# Patient Record
Sex: FEMALE | Race: OTHER | Employment: FULL TIME | URBAN - METROPOLITAN AREA
[De-identification: names, ages, dates, MRNs, and addresses within clinical notes are randomized per-mention and may not be internally consistent; named-entity substitution may affect disease eponyms.]

---

## 2021-01-04 ENCOUNTER — HOSPITAL ENCOUNTER (EMERGENCY)
Facility: HOSPITAL | Age: 21
Discharge: HOME | End: 2021-01-05
Attending: EMERGENCY MEDICINE
Payer: COMMERCIAL

## 2021-01-04 DIAGNOSIS — R42 VERTIGO: Primary | ICD-10-CM

## 2021-01-04 DIAGNOSIS — R11.2 NON-INTRACTABLE VOMITING WITH NAUSEA, UNSPECIFIED VOMITING TYPE: ICD-10-CM

## 2021-01-04 LAB
ALBUMIN SERPL-MCNC: 2.8 G/DL (ref 3.4–5)
ALBUMIN/GLOB SERPL: 0.7 {RATIO} (ref 1–2)
ALP LIVER SERPL-CCNC: 43 U/L
ALT SERPL-CCNC: 14 U/L
ANION GAP SERPL CALC-SCNC: 5 MMOL/L (ref 0–18)
AST SERPL-CCNC: 12 U/L (ref 15–37)
BASOPHILS # BLD AUTO: 0.02 X10(3) UL (ref 0–0.2)
BASOPHILS NFR BLD AUTO: 0.3 %
BILIRUB SERPL-MCNC: 0.2 MG/DL (ref 0.1–2)
BILIRUB UR QL STRIP.AUTO: NEGATIVE
BUN BLD-MCNC: 6 MG/DL (ref 7–18)
BUN/CREAT SERPL: 14.3 (ref 10–20)
CALCIUM BLD-MCNC: 8.4 MG/DL (ref 8.5–10.1)
CHLORIDE SERPL-SCNC: 109 MMOL/L (ref 98–112)
CLARITY UR REFRACT.AUTO: CLEAR
CO2 SERPL-SCNC: 21 MMOL/L (ref 21–32)
CREAT BLD-MCNC: 0.42 MG/DL
DEPRECATED RDW RBC AUTO: 42.2 FL (ref 35.1–46.3)
EOSINOPHIL # BLD AUTO: 0.05 X10(3) UL (ref 0–0.7)
EOSINOPHIL NFR BLD AUTO: 0.6 %
ERYTHROCYTE [DISTWIDTH] IN BLOOD BY AUTOMATED COUNT: 13.4 % (ref 11–15)
GLOBULIN PLAS-MCNC: 3.8 G/DL (ref 2.8–4.4)
GLUCOSE BLD-MCNC: 81 MG/DL (ref 70–99)
GLUCOSE UR STRIP.AUTO-MCNC: NEGATIVE MG/DL
HCT VFR BLD AUTO: 35.4 %
HGB BLD-MCNC: 12 G/DL
IMM GRANULOCYTES # BLD AUTO: 0.02 X10(3) UL (ref 0–1)
IMM GRANULOCYTES NFR BLD: 0.3 %
KETONES UR STRIP.AUTO-MCNC: NEGATIVE MG/DL
LEUKOCYTE ESTERASE UR QL STRIP.AUTO: NEGATIVE
LYMPHOCYTES # BLD AUTO: 1.73 X10(3) UL (ref 1–4)
LYMPHOCYTES NFR BLD AUTO: 21.9 %
M PROTEIN MFR SERPL ELPH: 6.6 G/DL (ref 6.4–8.2)
MCH RBC QN AUTO: 29.5 PG (ref 26–34)
MCHC RBC AUTO-ENTMCNC: 33.9 G/DL (ref 31–37)
MCV RBC AUTO: 87 FL
MONOCYTES # BLD AUTO: 0.59 X10(3) UL (ref 0.1–1)
MONOCYTES NFR BLD AUTO: 7.5 %
NEUTROPHILS # BLD AUTO: 5.48 X10 (3) UL (ref 1.5–7.7)
NEUTROPHILS # BLD AUTO: 5.48 X10(3) UL (ref 1.5–7.7)
NEUTROPHILS NFR BLD AUTO: 69.4 %
NITRITE UR QL STRIP.AUTO: NEGATIVE
OSMOLALITY SERPL CALC.SUM OF ELEC: 277 MOSM/KG (ref 275–295)
PH UR STRIP.AUTO: 6 [PH] (ref 4.5–8)
PLATELET # BLD AUTO: 243 10(3)UL (ref 150–450)
POTASSIUM SERPL-SCNC: 3.3 MMOL/L (ref 3.5–5.1)
PROT UR STRIP.AUTO-MCNC: NEGATIVE MG/DL
RBC # BLD AUTO: 4.07 X10(6)UL
RBC UR QL AUTO: NEGATIVE
SODIUM SERPL-SCNC: 135 MMOL/L (ref 136–145)
SP GR UR STRIP.AUTO: 1.01 (ref 1–1.03)
UROBILINOGEN UR STRIP.AUTO-MCNC: <2 MG/DL
WBC # BLD AUTO: 7.9 X10(3) UL (ref 4–11)

## 2021-01-04 PROCEDURE — 85025 COMPLETE CBC W/AUTO DIFF WBC: CPT | Performed by: EMERGENCY MEDICINE

## 2021-01-04 PROCEDURE — 81003 URINALYSIS AUTO W/O SCOPE: CPT | Performed by: EMERGENCY MEDICINE

## 2021-01-04 PROCEDURE — 80053 COMPREHEN METABOLIC PANEL: CPT | Performed by: EMERGENCY MEDICINE

## 2021-01-04 PROCEDURE — 96361 HYDRATE IV INFUSION ADD-ON: CPT

## 2021-01-04 PROCEDURE — 96374 THER/PROPH/DIAG INJ IV PUSH: CPT

## 2021-01-04 PROCEDURE — 99284 EMERGENCY DEPT VISIT MOD MDM: CPT

## 2021-01-04 PROCEDURE — 96375 TX/PRO/DX INJ NEW DRUG ADDON: CPT

## 2021-01-04 RX ORDER — ONDANSETRON 4 MG/1
4 TABLET, ORALLY DISINTEGRATING ORAL EVERY 4 HOURS PRN
Qty: 10 TABLET | Refills: 0 | Status: SHIPPED | OUTPATIENT
Start: 2021-01-04 | End: 2021-01-11

## 2021-01-04 RX ORDER — METOCLOPRAMIDE HYDROCHLORIDE 5 MG/ML
10 INJECTION INTRAMUSCULAR; INTRAVENOUS ONCE
Status: COMPLETED | OUTPATIENT
Start: 2021-01-04 | End: 2021-01-04

## 2021-01-04 RX ORDER — DIPHENHYDRAMINE HYDROCHLORIDE 50 MG/ML
25 INJECTION INTRAMUSCULAR; INTRAVENOUS ONCE
Status: COMPLETED | OUTPATIENT
Start: 2021-01-04 | End: 2021-01-04

## 2021-01-04 RX ORDER — MECLIZINE HYDROCHLORIDE 25 MG/1
25 TABLET ORAL 3 TIMES DAILY PRN
Qty: 15 TABLET | Refills: 0 | Status: SHIPPED | OUTPATIENT
Start: 2021-01-04

## 2021-01-05 VITALS
DIASTOLIC BLOOD PRESSURE: 77 MMHG | BODY MASS INDEX: 22 KG/M2 | SYSTOLIC BLOOD PRESSURE: 110 MMHG | WEIGHT: 115 LBS | OXYGEN SATURATION: 100 % | RESPIRATION RATE: 14 BRPM | HEART RATE: 66 BPM | TEMPERATURE: 98 F

## 2021-01-05 NOTE — ED PROVIDER NOTES
Patient Seen in: [facility] Emergency Department      History   Patient presents with:  Nausea/Vomiting/Diarrhea    Stated Complaint: vomiting since yesterday, not typical for this pregnancy, 14 weeks     HPI/Subjective:   HPI    78-year-old female show no pallor. Oropharynx clear, mucous membranes moist   Neck: supple, no rigidity   Lungs: good air exchange and clear   Heart: regular rate rhythm and no murmur   Abdomen: Soft and nontender. No abdominal masses.   No peritoneal signs   Extremities: n Impression:  Vertigo  (primary encounter diagnosis)  Non-intractable vomiting with nausea, unspecified vomiting type    Disposition:  Discharge  1/5/2021 12:08 am    Follow-up:  Your primary care doctor    Call            Medications Prescribed:  Current D

## 2021-01-05 NOTE — ED INITIAL ASSESSMENT (HPI)
20YF c/ cof NVD Pt state that she been vomiting and dizzy all day Pt state she is 14 weeks preg.  Pt denies any abd pain, discharge and bleeding

## 2022-01-06 ENCOUNTER — HOSPITAL ENCOUNTER (EMERGENCY)
Age: 22
Discharge: HOME | End: 2022-01-07
Attending: EMERGENCY MEDICINE
Payer: COMMERCIAL

## 2022-01-06 DIAGNOSIS — N73.0 PID (ACUTE PELVIC INFLAMMATORY DISEASE): Primary | ICD-10-CM

## 2022-01-06 LAB
ALBUMIN SERPL-MCNC: 3.5 G/DL (ref 3.4–5)
ALBUMIN/GLOB SERPL: 0.9 {RATIO} (ref 1–2)
ALP LIVER SERPL-CCNC: 68 U/L
ALT SERPL-CCNC: 21 U/L
ANION GAP SERPL CALC-SCNC: 4 MMOL/L (ref 0–18)
AST SERPL-CCNC: 14 U/L (ref 15–37)
B-HCG UR QL: NEGATIVE
BASOPHILS # BLD AUTO: 0.03 X10(3) UL (ref 0–0.2)
BASOPHILS NFR BLD AUTO: 0.4 %
BILIRUB SERPL-MCNC: 0.3 MG/DL (ref 0.1–2)
BILIRUB UR QL STRIP.AUTO: NEGATIVE
BUN BLD-MCNC: 12 MG/DL (ref 7–18)
CALCIUM BLD-MCNC: 9 MG/DL (ref 8.5–10.1)
CHLORIDE SERPL-SCNC: 108 MMOL/L (ref 98–112)
CLARITY UR REFRACT.AUTO: CLEAR
CO2 SERPL-SCNC: 27 MMOL/L (ref 21–32)
COLOR UR AUTO: YELLOW
CREAT BLD-MCNC: 0.61 MG/DL
EOSINOPHIL # BLD AUTO: 0.14 X10(3) UL (ref 0–0.7)
EOSINOPHIL NFR BLD AUTO: 1.6 %
ERYTHROCYTE [DISTWIDTH] IN BLOOD BY AUTOMATED COUNT: 14.2 %
GLOBULIN PLAS-MCNC: 4.1 G/DL (ref 2.8–4.4)
GLUCOSE BLD-MCNC: 100 MG/DL (ref 70–99)
GLUCOSE UR STRIP.AUTO-MCNC: NEGATIVE MG/DL
HCT VFR BLD AUTO: 38.5 %
HGB BLD-MCNC: 12.5 G/DL
IMM GRANULOCYTES # BLD AUTO: 0.02 X10(3) UL (ref 0–1)
IMM GRANULOCYTES NFR BLD: 0.2 %
LEUKOCYTE ESTERASE UR QL STRIP.AUTO: NEGATIVE
LIPASE SERPL-CCNC: 160 U/L (ref 73–393)
LYMPHOCYTES # BLD AUTO: 2.23 X10(3) UL (ref 1–4)
LYMPHOCYTES NFR BLD AUTO: 26 %
MCH RBC QN AUTO: 27.8 PG (ref 26–34)
MCHC RBC AUTO-ENTMCNC: 32.5 G/DL (ref 31–37)
MCV RBC AUTO: 85.6 FL
MONOCYTES # BLD AUTO: 0.58 X10(3) UL (ref 0.1–1)
MONOCYTES NFR BLD AUTO: 6.8 %
NEUTROPHILS # BLD AUTO: 5.57 X10 (3) UL (ref 1.5–7.7)
NEUTROPHILS # BLD AUTO: 5.57 X10(3) UL (ref 1.5–7.7)
NEUTROPHILS NFR BLD AUTO: 65 %
NITRITE UR QL STRIP.AUTO: NEGATIVE
OSMOLALITY SERPL CALC.SUM OF ELEC: 288 MOSM/KG (ref 275–295)
PH UR STRIP.AUTO: 6 [PH] (ref 5–8)
PLATELET # BLD AUTO: 332 10(3)UL (ref 150–450)
POTASSIUM SERPL-SCNC: 3.2 MMOL/L (ref 3.5–5.1)
PROT SERPL-MCNC: 7.6 G/DL (ref 6.4–8.2)
RBC # BLD AUTO: 4.5 X10(6)UL
RBC UR QL AUTO: NEGATIVE
SODIUM SERPL-SCNC: 139 MMOL/L (ref 136–145)
SP GR UR STRIP.AUTO: >=1.03 (ref 1–1.03)
UROBILINOGEN UR STRIP.AUTO-MCNC: 0.2 MG/DL
WBC # BLD AUTO: 8.6 X10(3) UL (ref 4–11)

## 2022-01-06 PROCEDURE — 99285 EMERGENCY DEPT VISIT HI MDM: CPT

## 2022-01-06 PROCEDURE — 96374 THER/PROPH/DIAG INJ IV PUSH: CPT

## 2022-01-06 PROCEDURE — 96361 HYDRATE IV INFUSION ADD-ON: CPT

## 2022-01-06 PROCEDURE — 81001 URINALYSIS AUTO W/SCOPE: CPT | Performed by: EMERGENCY MEDICINE

## 2022-01-06 PROCEDURE — 85025 COMPLETE CBC W/AUTO DIFF WBC: CPT | Performed by: EMERGENCY MEDICINE

## 2022-01-06 PROCEDURE — 83690 ASSAY OF LIPASE: CPT | Performed by: EMERGENCY MEDICINE

## 2022-01-06 PROCEDURE — 81025 URINE PREGNANCY TEST: CPT

## 2022-01-06 PROCEDURE — 80053 COMPREHEN METABOLIC PANEL: CPT | Performed by: EMERGENCY MEDICINE

## 2022-01-06 PROCEDURE — 96372 THER/PROPH/DIAG INJ SC/IM: CPT

## 2022-01-06 PROCEDURE — 99284 EMERGENCY DEPT VISIT MOD MDM: CPT

## 2022-01-07 ENCOUNTER — APPOINTMENT (OUTPATIENT)
Dept: CT IMAGING | Age: 22
End: 2022-01-07
Attending: EMERGENCY MEDICINE
Payer: COMMERCIAL

## 2022-01-07 ENCOUNTER — APPOINTMENT (OUTPATIENT)
Dept: ULTRASOUND IMAGING | Age: 22
End: 2022-01-07
Attending: EMERGENCY MEDICINE
Payer: COMMERCIAL

## 2022-01-07 VITALS
SYSTOLIC BLOOD PRESSURE: 113 MMHG | DIASTOLIC BLOOD PRESSURE: 65 MMHG | TEMPERATURE: 98 F | HEART RATE: 65 BPM | RESPIRATION RATE: 17 BRPM | BODY MASS INDEX: 21 KG/M2 | OXYGEN SATURATION: 99 % | WEIGHT: 110 LBS

## 2022-01-07 PROCEDURE — 76830 TRANSVAGINAL US NON-OB: CPT | Performed by: EMERGENCY MEDICINE

## 2022-01-07 PROCEDURE — 87591 N.GONORRHOEAE DNA AMP PROB: CPT | Performed by: EMERGENCY MEDICINE

## 2022-01-07 PROCEDURE — 74177 CT ABD & PELVIS W/CONTRAST: CPT | Performed by: EMERGENCY MEDICINE

## 2022-01-07 PROCEDURE — 87480 CANDIDA DNA DIR PROBE: CPT | Performed by: EMERGENCY MEDICINE

## 2022-01-07 PROCEDURE — 87510 GARDNER VAG DNA DIR PROBE: CPT | Performed by: EMERGENCY MEDICINE

## 2022-01-07 PROCEDURE — 87491 CHLMYD TRACH DNA AMP PROBE: CPT | Performed by: EMERGENCY MEDICINE

## 2022-01-07 PROCEDURE — 76856 US EXAM PELVIC COMPLETE: CPT | Performed by: EMERGENCY MEDICINE

## 2022-01-07 PROCEDURE — 93975 VASCULAR STUDY: CPT | Performed by: EMERGENCY MEDICINE

## 2022-01-07 PROCEDURE — 87660 TRICHOMONAS VAGIN DIR PROBE: CPT | Performed by: EMERGENCY MEDICINE

## 2022-01-07 RX ORDER — METRONIDAZOLE 500 MG/1
2000 TABLET ORAL ONCE
Status: COMPLETED | OUTPATIENT
Start: 2022-01-07 | End: 2022-01-07

## 2022-01-07 RX ORDER — DOXYCYCLINE HYCLATE 100 MG/1
100 CAPSULE ORAL 2 TIMES DAILY
Qty: 28 CAPSULE | Refills: 0 | Status: SHIPPED | OUTPATIENT
Start: 2022-01-07 | End: 2022-01-21

## 2022-01-07 RX ORDER — KETOROLAC TROMETHAMINE 15 MG/ML
15 INJECTION, SOLUTION INTRAMUSCULAR; INTRAVENOUS ONCE
Status: COMPLETED | OUTPATIENT
Start: 2022-01-07 | End: 2022-01-07

## 2022-01-07 NOTE — ED QUICK NOTES
Patient yelling, using profanity, and taking pictures of staff stating she is going to put on social media d/t visitor policy.

## 2022-01-07 NOTE — ED PROVIDER NOTES
Patient Seen in: [facility] Emergency Department In [place]      History   Patient presents with:  Abdomen/Flank Pain  Nausea/Vomiting/Diarrhea    Stated Complaint: Lower abdominal pain , vomiting , diarrhea    Subjective:   HPI    43-year-old female pres well-appearing, in no acute distress. HEENT: no scleral icterus. Mucous membranes are moist.  Scalp is atraumatic. HEART: Regular rate and rhythm, no murmurs. LUNGS: Clear to auscultation bilaterally. No Rales, no rhonchi, no wheezing, no stridor.   AB Report  [facility], [facility]  [phone] - Phone    [place] Nick    NAME: Faye Duránledy OF EXAM: 01/07/2022  Patient No:  [id number]  Physician:  Eugene Jin  YOB: 2000    Ai MOON stomach, spleen, pancreas, adrenal glands, kidneys and aorta. -Unremarkable small and large bowel.  -Physiologic pelvic free fluid. No pneumoperitoneum.         Differential diagnosis includes ectopic pregnancy, ovarian torsion, ovarian cyst, PID, TOA, ap Medication List    START taking these medications    doxycycline 100 MG Oral Cap  Take 1 capsule (100 mg total) by mouth 2 (two) times daily for 14 days.   Qty: 28 capsule Refills: 0

## 2022-01-07 NOTE — ED INITIAL ASSESSMENT (HPI)
Pt came in w/ c/o right lower abdomen sharp pain that started 1/4/22. Per pt vomited today and episodes of diarrhea.   Pt states also having burning sensation with urination

## 2022-01-10 LAB
C TRACH DNA SPEC QL NAA+PROBE: NEGATIVE
N GONORRHOEA DNA SPEC QL NAA+PROBE: NEGATIVE

## 2023-06-20 ENCOUNTER — TELEPHONE (OUTPATIENT)
Dept: OBGYN CLINIC | Facility: CLINIC | Age: 23
End: 2023-06-20

## 2023-06-20 NOTE — TELEPHONE ENCOUNTER
Patient calling to initiate prenatal care  LMP 5/6/23  Patient is 7-8 weeks on 7/1/23  Confirmation Ultrasound and Appointment scheduled on   Future Appointments   Date Time Provider Marcela Perla   7/17/2023 10:15 AM [facility] OB US PLFD [facility] OB[facility] P [facility] [address]   7/17/2023 10:45 AM Kate Matthews MD [facility] P [facility] [address]   8/3/2023 12:30 PM Hieu Cruz MD [facility] OB[facility]N N [facility] [address]         Any history of ectopic pregnancy? no  Any history of miscarriage? yes  Any medications that you are taking on a regular basis other than prenatal vitamins? Iron Pills (if not taking prenatal vitamins, encourage patient to start taking.)  Any bleeding since the first day of last LMP and your positive pregnancy test? No    Insurance Medicaid     Pt states she had a miscarriage a month ago and has not had a period since. She states she works for [employer] and purchased blood tests and her hcG keeps going up.

## 2023-07-05 NOTE — TELEPHONE ENCOUNTER
Spoke to patient. Patient is taking an iron supplement and vitamin D daily- has not started prenatal.  Advised patient to contact her other healthcare providers to notify of new pregnancy so current therapy can be adjusted if needed. Recommended starting prenatal vitamin now. Discussed possibility of constipation associated with iron- advised 64 oz water daily, high fiber foods, regular exercise and OTC colace as stool softener if needed. Patient denies bleeding or cramping pain since LMP. Instructed patient to go to the ER with period flow bleeding or intense cramping pain that is worse than a normal menstrual cramp. Patient verbalized understanding. US and appointment scheduled for 7/17/23.

## 2023-07-17 ENCOUNTER — ULTRASOUND ENCOUNTER (OUTPATIENT)
Dept: OBGYN CLINIC | Facility: CLINIC | Age: 23
End: 2023-07-17
Payer: COMMERCIAL

## 2023-07-17 ENCOUNTER — OFFICE VISIT (OUTPATIENT)
Dept: OBGYN CLINIC | Facility: CLINIC | Age: 23
End: 2023-07-17
Payer: COMMERCIAL

## 2023-07-17 VITALS
SYSTOLIC BLOOD PRESSURE: 100 MMHG | HEART RATE: 62 BPM | WEIGHT: 119.25 LBS | HEIGHT: 62 IN | BODY MASS INDEX: 21.94 KG/M2 | DIASTOLIC BLOOD PRESSURE: 62 MMHG

## 2023-07-17 DIAGNOSIS — N91.1 SECONDARY AMENORRHEA: Primary | ICD-10-CM

## 2023-07-17 DIAGNOSIS — Z13.79 GENETIC SCREENING: ICD-10-CM

## 2023-07-17 DIAGNOSIS — Z32.01 PREGNANCY EXAMINATION OR TEST, POSITIVE RESULT: ICD-10-CM

## 2023-07-17 RX ORDER — ERGOCALCIFEROL 1.25 MG/1
CAPSULE ORAL
COMMUNITY
Start: 2023-06-18

## 2023-07-26 LAB
GESTATIONAL AGE (IN DAYS): 3
GESTATIONAL AGE(IN WEEKS): 10
MICRODELETION: NOT DETECTED
NUMBER OF FETUSES: 1
TRISOMY 13 (T13): NEGATIVE
TRISOMY 18 (T18): NEGATIVE
TRISOMY 21 (T21): NEGATIVE
Y CHROMOSOME: DETECTED

## 2023-08-03 ENCOUNTER — INITIAL PRENATAL (OUTPATIENT)
Dept: OBGYN CLINIC | Facility: CLINIC | Age: 23
End: 2023-08-03
Payer: COMMERCIAL

## 2023-08-03 VITALS
DIASTOLIC BLOOD PRESSURE: 62 MMHG | HEIGHT: 62 IN | SYSTOLIC BLOOD PRESSURE: 99 MMHG | BODY MASS INDEX: 22.26 KG/M2 | WEIGHT: 121 LBS

## 2023-08-03 DIAGNOSIS — Z3A.12 12 WEEKS GESTATION OF PREGNANCY: Primary | ICD-10-CM

## 2023-08-03 PROBLEM — Z87.51 HISTORY OF PRETERM DELIVERY: Status: ACTIVE | Noted: 2023-08-03

## 2023-08-03 PROBLEM — Z34.81 SUPERVISION OF NORMAL INTRAUTERINE PREGNANCY IN MULTIGRAVIDA IN FIRST TRIMESTER: Status: ACTIVE | Noted: 2023-08-03

## 2023-08-03 PROBLEM — Z34.81: Status: ACTIVE | Noted: 2023-08-03

## 2023-08-03 LAB
GLUCOSE (URINE DIPSTICK): NEGATIVE MG/DL
MULTISTIX LOT#: NORMAL NUMERIC
PROTEIN (URINE DIPSTICK): NEGATIVE MG/DL

## 2023-08-04 LAB
CHLAMYDIA TRACHOMATIS$RNA, TMA: NOT DETECTED
NEISSERIA GONORRHOEAE$RNA, TMA: NOT DETECTED

## 2023-08-07 LAB
ABSOLUTE BASOPHILS: 33 CELLS/UL (ref 0–200)
ABSOLUTE EOSINOPHILS: 196 CELLS/UL (ref 15–500)
ABSOLUTE LYMPHOCYTES: 2420 CELLS/UL (ref 850–3900)
ABSOLUTE MONOCYTES: 556 CELLS/UL (ref 200–950)
ABSOLUTE NEUTROPHILS: 7695 CELLS/UL (ref 1500–7800)
BASOPHILS: 0.3 %
EOSINOPHILS: 1.8 %
HEMATOCRIT: 39.7 % (ref 35–45)
HEMOGLOBIN: 12.9 G/DL (ref 11.7–15.5)
LYMPHOCYTES: 22.2 %
MCH: 28.6 PG (ref 27–33)
MCHC: 32.5 G/DL (ref 32–36)
MCV: 88 FL (ref 80–100)
MONOCYTES: 5.1 %
MPV: 11.5 FL (ref 7.5–12.5)
NEUTROPHILS: 70.6 %
PLATELET COUNT: 277 THOUSAND/UL (ref 140–400)
RDW: 12.8 % (ref 11–15)
RED BLOOD CELL COUNT: 4.51 MILLION/UL (ref 3.8–5.1)
RUBELLA ANTIBODY (IGG): 1.68 INDEX
WHITE BLOOD CELL COUNT: 10.9 THOUSAND/UL (ref 3.8–10.8)

## 2023-08-09 ENCOUNTER — TELEPHONE (OUTPATIENT)
Dept: OBGYN CLINIC | Facility: CLINIC | Age: 23
End: 2023-08-09

## 2023-08-09 NOTE — TELEPHONE ENCOUNTER
Fax received from [address] [place] [address] requesting additional diagnosis code for chlamydia and gonorrhea testing. Additional diagnosis code provided by fax as Z11.3 screening for STD.   Fax confirmation received [phone]

## 2023-08-31 ENCOUNTER — TELEPHONE (OUTPATIENT)
Dept: OBGYN CLINIC | Facility: CLINIC | Age: 23
End: 2023-08-31

## 2023-08-31 ENCOUNTER — ROUTINE PRENATAL (OUTPATIENT)
Dept: OBGYN CLINIC | Facility: CLINIC | Age: 23
End: 2023-08-31
Payer: COMMERCIAL

## 2023-08-31 VITALS
DIASTOLIC BLOOD PRESSURE: 68 MMHG | BODY MASS INDEX: 23.37 KG/M2 | WEIGHT: 127 LBS | HEART RATE: 93 BPM | HEIGHT: 62 IN | SYSTOLIC BLOOD PRESSURE: 100 MMHG

## 2023-08-31 DIAGNOSIS — Z3A.16 16 WEEKS GESTATION OF PREGNANCY: Primary | ICD-10-CM

## 2023-08-31 PROBLEM — O26.899 PREGNANCY RELATED NAUSEA, ANTEPARTUM (HCC): Status: ACTIVE | Noted: 2023-08-31

## 2023-08-31 PROBLEM — R11.0 PREGNANCY RELATED NAUSEA, ANTEPARTUM: Status: ACTIVE | Noted: 2023-08-31

## 2023-08-31 PROBLEM — O26.899 PREGNANCY RELATED NAUSEA, ANTEPARTUM: Status: ACTIVE | Noted: 2023-08-31

## 2023-08-31 PROBLEM — R11.0 PREGNANCY RELATED NAUSEA, ANTEPARTUM (HCC): Status: ACTIVE | Noted: 2023-08-31

## 2023-08-31 RX ORDER — ONDANSETRON 4 MG/1
4 TABLET, ORALLY DISINTEGRATING ORAL EVERY 8 HOURS PRN
Qty: 30 TABLET | Refills: 1 | Status: SHIPPED | OUTPATIENT
Start: 2023-08-31

## 2023-08-31 RX ORDER — ONDANSETRON 4 MG/1
4 TABLET, ORALLY DISINTEGRATING ORAL EVERY 8 HOURS PRN
Qty: 30 TABLET | Refills: 1 | Status: SHIPPED | OUTPATIENT
Start: 2023-08-31 | End: 2023-08-31

## 2023-08-31 NOTE — TELEPHONE ENCOUNTER
Patient arrived 11m late and was offered to come back at 1130 or 2 pm same day. She stated she could not and offered her tomorrow Friday and she stated \"No forget it\" and walked out. She called office to speak to [facility] and was told to come back in to see Dr Lauri Lam . Providers have stated several times to not ask to see patients after 10m fiordaliza. There are exceptions of course , in this case patient is 16 weeks with no complications and is aware of 80T late policy because she came in stating she was 10m late and only a minute after that. Which would make her 11m late.

## 2023-09-05 ENCOUNTER — PATIENT MESSAGE (OUTPATIENT)
Dept: OBGYN CLINIC | Facility: CLINIC | Age: 23
End: 2023-09-05

## 2023-09-05 LAB
AFP-L3: 30 % (ref 0.5–9.9)
AFP: 51.1 NG/ML (ref 1.6–4.5)

## 2023-09-07 ENCOUNTER — TELEPHONE (OUTPATIENT)
Dept: OBGYN CLINIC | Facility: CLINIC | Age: 23
End: 2023-09-07

## 2023-09-07 RX ORDER — VALACYCLOVIR HYDROCHLORIDE 1 G/1
2000 TABLET, FILM COATED ORAL ONCE
Qty: 2 TABLET | Refills: 0 | Status: SHIPPED | OUTPATIENT
Start: 2023-09-07 | End: 2023-09-07

## 2023-09-07 NOTE — TELEPHONE ENCOUNTER
Patient calling with c/o of recurrent cold sores. States had two of them 2 weeks ago and it healed. Reports another one just started yesterday. One time dose sent in; patient notified via [address]

## 2023-09-08 ENCOUNTER — TELEPHONE (OUTPATIENT)
Dept: OBGYN CLINIC | Facility: CLINIC | Age: 23
End: 2023-09-08

## 2023-09-08 NOTE — TELEPHONE ENCOUNTER
Called [facility] back; they needed some missing information. Information provided; states should have results by end of day on Monday. No further questions.

## 2023-09-08 NOTE — TELEPHONE ENCOUNTER
Leslie Dia from [employer] called regarding a lab pt had done. Lab AFP, they just need some more information and are requesting a call back. Phone number is [phone] and reference number is [id number].

## 2023-09-11 ENCOUNTER — PATIENT MESSAGE (OUTPATIENT)
Dept: OBGYN CLINIC | Facility: CLINIC | Age: 23
End: 2023-09-11

## 2023-09-12 LAB
AFP MOM: 1.3
AFP, SERUM: 51.6 NG/ML
AFP-L3: 30 % (ref 0.5–9.9)
AFP: 51.1 NG/ML (ref 1.6–4.5)
CALC'D GESTATIONAL AGE: 16.9 WEEKS
DONOR EGG: NO
HX OF NEURAL TUBE DEFECTS: NO
INSULIN DEPEND DIABETIC: NO
MATERNAL WEIGHT: 127 LBS
NUMBER OF FETUSES: 1
PREV PREGNANCY DOWN SYND: NO
REPEAT SPECIMEN: NO

## 2023-09-28 ENCOUNTER — ULTRASOUND ENCOUNTER (OUTPATIENT)
Dept: OBGYN CLINIC | Facility: CLINIC | Age: 23
End: 2023-09-28
Payer: COMMERCIAL

## 2023-09-28 ENCOUNTER — ROUTINE PRENATAL (OUTPATIENT)
Dept: OBGYN CLINIC | Facility: CLINIC | Age: 23
End: 2023-09-28
Payer: COMMERCIAL

## 2023-09-28 VITALS
WEIGHT: 129.5 LBS | HEIGHT: 62 IN | SYSTOLIC BLOOD PRESSURE: 102 MMHG | DIASTOLIC BLOOD PRESSURE: 64 MMHG | HEART RATE: 78 BPM | BODY MASS INDEX: 23.83 KG/M2

## 2023-09-28 DIAGNOSIS — Z36.89 SCREENING, ANTENATAL, FOR FETAL ANATOMIC SURVEY: ICD-10-CM

## 2023-09-28 DIAGNOSIS — Z36.9 ANTENATAL SCREENING ENCOUNTER: Primary | ICD-10-CM

## 2023-09-28 PROCEDURE — 3078F DIAST BP <80 MM HG: CPT | Performed by: OBSTETRICS & GYNECOLOGY

## 2023-09-28 PROCEDURE — 3074F SYST BP LT 130 MM HG: CPT | Performed by: OBSTETRICS & GYNECOLOGY

## 2023-09-28 PROCEDURE — 3008F BODY MASS INDEX DOCD: CPT | Performed by: OBSTETRICS & GYNECOLOGY

## 2023-09-28 PROCEDURE — 76805 OB US >/= 14 WKS SNGL FETUS: CPT | Performed by: OBSTETRICS & GYNECOLOGY

## 2023-09-28 NOTE — PROGRESS NOTES
Patient presents with:  Prenatal Care: SIERRA after U/S    Routine prenatal visit. Patient without complaints. Good fetal movement. Patient denies any bleeding, leaking fluid, cramping, or contractions. Assessment/Plan:  20w5d doing well  1 hr GTT and CBC next. Blood type   Lab Results   Component Value Date    ABO O 2023    RH RH(D) POSITIVE 2023   Screening for fetal malformations- Structure survey done today, normal.  Patient had Qnatal and AFP. Declines carrier and level 2. Diagnoses and all orders for this visit:     screening encounter  -     Glucose Tolerance, 50 gm (1 hr), Gestational (ADA)  -     CBC With Differential With Platelet    Screening, , for fetal anatomic survey  -     US OB COMPLETE 2ND TRIMESTER >14 WKS [facility] ONLY [id number]; Future      Return in about 4 weeks (around 10/26/2023) for Routine Prenatal Visit, Glucola and labs.

## 2023-10-27 ENCOUNTER — ROUTINE PRENATAL (OUTPATIENT)
Dept: OBGYN CLINIC | Facility: CLINIC | Age: 23
End: 2023-10-27

## 2023-10-27 VITALS
SYSTOLIC BLOOD PRESSURE: 98 MMHG | BODY MASS INDEX: 24.66 KG/M2 | HEART RATE: 78 BPM | DIASTOLIC BLOOD PRESSURE: 62 MMHG | HEIGHT: 62 IN | WEIGHT: 134 LBS

## 2023-10-27 DIAGNOSIS — Z3A.24 24 WEEKS GESTATION OF PREGNANCY: Primary | ICD-10-CM

## 2023-10-27 PROCEDURE — 3008F BODY MASS INDEX DOCD: CPT | Performed by: STUDENT IN AN ORGANIZED HEALTH CARE EDUCATION/TRAINING PROGRAM

## 2023-10-27 PROCEDURE — 3074F SYST BP LT 130 MM HG: CPT | Performed by: STUDENT IN AN ORGANIZED HEALTH CARE EDUCATION/TRAINING PROGRAM

## 2023-10-27 PROCEDURE — 3078F DIAST BP <80 MM HG: CPT | Performed by: STUDENT IN AN ORGANIZED HEALTH CARE EDUCATION/TRAINING PROGRAM
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
## 2023-11-08 LAB
ABSOLUTE BASOPHILS: 38 CELLS/UL (ref 0–200)
ABSOLUTE EOSINOPHILS: 238 CELLS/UL (ref 15–500)
ABSOLUTE LYMPHOCYTES: 1625 CELLS/UL (ref 850–3900)
ABSOLUTE MONOCYTES: 613 CELLS/UL (ref 200–950)
ABSOLUTE NEUTROPHILS: 9988 CELLS/UL (ref 1500–7800)
BASOPHILS: 0.3 %
EOSINOPHILS: 1.9 %
GLUCOSE, GESTATIONAL SCREEN (50G)-130 CUTOFF: 80 MG/DL
HEMATOCRIT: 34.6 % (ref 35–45)
HEMOGLOBIN: 11.4 G/DL (ref 11.7–15.5)
LYMPHOCYTES: 13 %
MCH: 29.5 PG (ref 27–33)
MCHC: 32.9 G/DL (ref 32–36)
MCV: 89.6 FL (ref 80–100)
MONOCYTES: 4.9 %
MPV: 11.6 FL (ref 7.5–12.5)
NEUTROPHILS: 79.9 %
PLATELET COUNT: 263 THOUSAND/UL (ref 140–400)
RDW: 12.5 % (ref 11–15)
RED BLOOD CELL COUNT: 3.86 MILLION/UL (ref 3.8–5.1)
WHITE BLOOD CELL COUNT: 12.5 THOUSAND/UL (ref 3.8–10.8)

## 2023-11-24 ENCOUNTER — ROUTINE PRENATAL (OUTPATIENT)
Dept: OBGYN CLINIC | Facility: CLINIC | Age: 23
End: 2023-11-24
Payer: COMMERCIAL

## 2023-11-24 VITALS
SYSTOLIC BLOOD PRESSURE: 104 MMHG | HEIGHT: 62 IN | WEIGHT: 140.19 LBS | DIASTOLIC BLOOD PRESSURE: 62 MMHG | BODY MASS INDEX: 25.8 KG/M2 | HEART RATE: 82 BPM

## 2023-11-24 DIAGNOSIS — Z3A.28 28 WEEKS GESTATION OF PREGNANCY: Primary | ICD-10-CM

## 2023-11-24 DIAGNOSIS — Z36.9 ANTENATAL SCREENING ENCOUNTER: ICD-10-CM

## 2023-11-24 PROCEDURE — 99212 OFFICE O/P EST SF 10 MIN: CPT | Performed by: NURSE PRACTITIONER

## 2023-11-24 PROCEDURE — 3074F SYST BP LT 130 MM HG: CPT | Performed by: NURSE PRACTITIONER

## 2023-11-24 PROCEDURE — 3078F DIAST BP <80 MM HG: CPT | Performed by: NURSE PRACTITIONER

## 2023-11-24 PROCEDURE — 3008F BODY MASS INDEX DOCD: CPT | Performed by: NURSE PRACTITIONER

## 2023-11-24 RX ORDER — PRENATAL VIT/IRON FUM/FOLIC AC 27MG-0.8MG
1 TABLET ORAL DAILY
COMMUNITY

## 2023-11-24 RX ORDER — PREDNISONE 20 MG/1
40 TABLET ORAL DAILY
COMMUNITY
Start: 2023-11-08

## 2023-11-24 RX ORDER — AMOXICILLIN AND CLAVULANATE POTASSIUM 875; 125 MG/1; MG/1
1 TABLET, FILM COATED ORAL EVERY 12 HOURS
COMMUNITY
Start: 2023-11-08
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
## 2023-12-07 ENCOUNTER — ROUTINE PRENATAL (OUTPATIENT)
Dept: OBGYN CLINIC | Facility: CLINIC | Age: 23
End: 2023-12-07
Payer: MEDICAID

## 2023-12-07 VITALS
HEART RATE: 89 BPM | WEIGHT: 144.13 LBS | BODY MASS INDEX: 26.52 KG/M2 | DIASTOLIC BLOOD PRESSURE: 60 MMHG | SYSTOLIC BLOOD PRESSURE: 102 MMHG | HEIGHT: 62 IN

## 2023-12-07 DIAGNOSIS — Z87.51 HISTORY OF PRETERM DELIVERY: ICD-10-CM

## 2023-12-07 DIAGNOSIS — Z34.80 SUPERVISION OF OTHER NORMAL PREGNANCY, ANTEPARTUM: Primary | ICD-10-CM

## 2023-12-07 PROCEDURE — 3078F DIAST BP <80 MM HG: CPT | Performed by: OBSTETRICS & GYNECOLOGY

## 2023-12-07 PROCEDURE — 3008F BODY MASS INDEX DOCD: CPT | Performed by: OBSTETRICS & GYNECOLOGY

## 2023-12-07 PROCEDURE — 3074F SYST BP LT 130 MM HG: CPT | Performed by: OBSTETRICS & GYNECOLOGY

## 2023-12-07 RX ORDER — ALBUTEROL SULFATE 90 UG/1
AEROSOL, METERED RESPIRATORY (INHALATION)
COMMUNITY
Start: 2023-11-29

## 2023-12-21 ENCOUNTER — ROUTINE PRENATAL (OUTPATIENT)
Dept: OBGYN CLINIC | Facility: CLINIC | Age: 23
End: 2023-12-21
Payer: MEDICAID

## 2023-12-21 VITALS
SYSTOLIC BLOOD PRESSURE: 96 MMHG | BODY MASS INDEX: 27 KG/M2 | WEIGHT: 146 LBS | HEART RATE: 64 BPM | DIASTOLIC BLOOD PRESSURE: 56 MMHG

## 2023-12-21 DIAGNOSIS — Z34.80 SUPERVISION OF OTHER NORMAL PREGNANCY, ANTEPARTUM: Primary | ICD-10-CM

## 2023-12-21 DIAGNOSIS — Z87.51 HISTORY OF PRETERM DELIVERY: ICD-10-CM

## 2023-12-21 PROCEDURE — 3074F SYST BP LT 130 MM HG: CPT | Performed by: NURSE PRACTITIONER

## 2023-12-21 PROCEDURE — 3078F DIAST BP <80 MM HG: CPT | Performed by: NURSE PRACTITIONER

## 2024-01-04 ENCOUNTER — ROUTINE PRENATAL (OUTPATIENT)
Dept: OBGYN CLINIC | Facility: CLINIC | Age: 24
End: 2024-01-04
Payer: COMMERCIAL

## 2024-01-04 VITALS
WEIGHT: 147 LBS | SYSTOLIC BLOOD PRESSURE: 108 MMHG | BODY MASS INDEX: 27 KG/M2 | HEART RATE: 96 BPM | DIASTOLIC BLOOD PRESSURE: 60 MMHG

## 2024-01-04 DIAGNOSIS — Z3A.34 34 WEEKS GESTATION OF PREGNANCY: Primary | ICD-10-CM

## 2024-01-04 PROBLEM — Z28.21 TETANUS, DIPHTHERIA, AND ACELLULAR PERTUSSIS (TDAP) VACCINATION DECLINED: Status: ACTIVE | Noted: 2024-01-04

## 2024-01-04 PROCEDURE — 3078F DIAST BP <80 MM HG: CPT | Performed by: STUDENT IN AN ORGANIZED HEALTH CARE EDUCATION/TRAINING PROGRAM

## 2024-01-04 PROCEDURE — 3074F SYST BP LT 130 MM HG: CPT | Performed by: STUDENT IN AN ORGANIZED HEALTH CARE EDUCATION/TRAINING PROGRAM
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
## 2024-01-04 NOTE — PATIENT INSTRUCTIONS
KICK COUNT INSTRUCTIONS    After 28 weeks of pregnancy, we would like for you to monitor your baby’s movement daily by doing kick counts, an easy way for you to assess your baby’s well-being.    How to count kicks:  Choose a time when the baby is active, such as after a meal.  Sit comfortably or lie on your side, and count the number of movements in an hour… you should feel 10 movements in 2 hours    The evening hours seem to be the most convenient time to do this test, although you can also do this test anytime you are concerned about the baby’s movement.    Call the office right away at [phone] if you notice any of the following:  Your baby moves less than 10 times in 2 hours while you are doing kick counts.  Your baby moves much less often than on the days before.  You have not felt your baby move all day.

## 2024-01-05 ENCOUNTER — TELEPHONE (OUTPATIENT)
Dept: OBGYN CLINIC | Facility: CLINIC | Age: 24
End: 2024-01-05

## 2024-01-05 NOTE — TELEPHONE ENCOUNTER
----- Message from Dillon Pritchett MD sent at 1/4/2024  5:10 PM CST -----  Request IOL at/between this GA: 39 wks  Estimated Date of Delivery: 2/10/24  Indication for IOL:  elective   Time of appointment: PM  Cervical ripening with cytotec: yes  IOL with pitocin: yes, per protocol   OB history/complications: none

## 2024-01-18 ENCOUNTER — ROUTINE PRENATAL (OUTPATIENT)
Dept: OBGYN CLINIC | Facility: CLINIC | Age: 24
End: 2024-01-18
Payer: MEDICAID

## 2024-01-18 VITALS
DIASTOLIC BLOOD PRESSURE: 72 MMHG | SYSTOLIC BLOOD PRESSURE: 106 MMHG | WEIGHT: 150 LBS | BODY MASS INDEX: 27 KG/M2 | HEART RATE: 108 BPM

## 2024-01-18 DIAGNOSIS — Z34.80 SUPERVISION OF OTHER NORMAL PREGNANCY, ANTEPARTUM: Primary | ICD-10-CM

## 2024-01-18 PROCEDURE — 3078F DIAST BP <80 MM HG: CPT | Performed by: OBSTETRICS & GYNECOLOGY

## 2024-01-18 PROCEDURE — 87081 CULTURE SCREEN ONLY: CPT | Performed by: OBSTETRICS & GYNECOLOGY

## 2024-01-18 PROCEDURE — 3074F SYST BP LT 130 MM HG: CPT | Performed by: OBSTETRICS & GYNECOLOGY

## 2024-01-18 PROCEDURE — 87150 DNA/RNA AMPLIFIED PROBE: CPT | Performed by: OBSTETRICS & GYNECOLOGY

## 2024-01-18 RX ORDER — ERGOCALCIFEROL 1.25 MG/1
CAPSULE ORAL
COMMUNITY
Start: 2024-01-09
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
## 2024-01-20 LAB — GROUP B STREP BY PCR FOR PCR OVT: POSITIVE

## 2024-01-22 PROBLEM — O99.820 GBS (GROUP B STREPTOCOCCUS CARRIER), +RV CULTURE, CURRENTLY PREGNANT (HCC): Status: ACTIVE | Noted: 2024-01-22

## 2024-01-22 PROBLEM — O99.820 GBS (GROUP B STREPTOCOCCUS CARRIER), +RV CULTURE, CURRENTLY PREGNANT: Status: ACTIVE | Noted: 2024-01-22

## 2024-01-25 ENCOUNTER — TELEPHONE (OUTPATIENT)
Dept: OBGYN CLINIC | Facility: CLINIC | Age: 24
End: 2024-01-25

## 2024-01-25 NOTE — TELEPHONE ENCOUNTER
Hx:  GA: 37w5d    Spoke to patient and states she is experiencing sharp pain/pressure when urinating. Patient offered to place an order for urine sample today or wait until she is seen tomorrow for her SIERRA visit.     No further questions.

## 2024-01-25 NOTE — TELEPHONE ENCOUNTER
Patient is pregnant and thinks she has a UTI.  She has pressure and painful urination.  Please call to advise

## 2024-01-26 ENCOUNTER — ROUTINE PRENATAL (OUTPATIENT)
Dept: OBGYN CLINIC | Facility: CLINIC | Age: 24
End: 2024-01-26
Payer: COMMERCIAL

## 2024-01-26 VITALS
SYSTOLIC BLOOD PRESSURE: 106 MMHG | HEIGHT: 62 IN | WEIGHT: 153.13 LBS | HEART RATE: 87 BPM | BODY MASS INDEX: 28.18 KG/M2 | DIASTOLIC BLOOD PRESSURE: 62 MMHG

## 2024-01-26 DIAGNOSIS — R30.0 BURNING WITH URINATION: ICD-10-CM

## 2024-01-26 DIAGNOSIS — O99.820 GBS (GROUP B STREPTOCOCCUS CARRIER), +RV CULTURE, CURRENTLY PREGNANT: ICD-10-CM

## 2024-01-26 DIAGNOSIS — Z3A.37 37 WEEKS GESTATION OF PREGNANCY: Primary | ICD-10-CM

## 2024-01-26 LAB
APPEARANCE: CLEAR
BILIRUBIN: NEGATIVE
GLUCOSE (URINE DIPSTICK): NEGATIVE MG/DL
KETONES (URINE DIPSTICK): NEGATIVE MG/DL
LEUKOCYTES: NEGATIVE
MULTISTIX LOT#: ABNORMAL NUMERIC
NITRITE, URINE: NEGATIVE
OCCULT BLOOD: NEGATIVE
PH, URINE: 5.5 (ref 4.5–8)
PROTEIN (URINE DIPSTICK): NEGATIVE MG/DL
SPECIFIC GRAVITY: 1.03 (ref 1–1.03)
UROBILINOGEN,SEMI-QN: 0.2 MG/DL (ref 0–1.9)

## 2024-01-26 PROCEDURE — 87086 URINE CULTURE/COLONY COUNT: CPT | Performed by: NURSE PRACTITIONER

## 2024-01-26 PROCEDURE — 81002 URINALYSIS NONAUTO W/O SCOPE: CPT | Performed by: NURSE PRACTITIONER

## 2024-01-26 PROCEDURE — 3078F DIAST BP <80 MM HG: CPT | Performed by: NURSE PRACTITIONER

## 2024-01-26 PROCEDURE — 3008F BODY MASS INDEX DOCD: CPT | Performed by: NURSE PRACTITIONER

## 2024-01-26 PROCEDURE — 99213 OFFICE O/P EST LOW 20 MIN: CPT | Performed by: NURSE PRACTITIONER

## 2024-01-26 PROCEDURE — 3074F SYST BP LT 130 MM HG: CPT | Performed by: NURSE PRACTITIONER
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
## 2024-01-29 ENCOUNTER — TELEPHONE (OUTPATIENT)
Dept: OBGYN UNIT | Facility: HOSPITAL | Age: 24
End: 2024-01-29

## 2024-02-04 ENCOUNTER — HOSPITAL ENCOUNTER (INPATIENT)
Facility: HOSPITAL | Age: 24
LOS: 2 days | Discharge: HOME | End: 2024-02-06
Attending: OBSTETRICS & GYNECOLOGY | Admitting: OBSTETRICS & GYNECOLOGY
Payer: COMMERCIAL

## 2024-02-04 ENCOUNTER — APPOINTMENT (OUTPATIENT)
Dept: OBGYN CLINIC | Facility: HOSPITAL | Age: 24
End: 2024-02-04
Payer: COMMERCIAL

## 2024-02-04 PROBLEM — Z34.90 PREGNANCY: Status: ACTIVE | Noted: 2024-02-04

## 2024-02-04 PROBLEM — Z34.90 PREGNANCY (HCC): Status: ACTIVE | Noted: 2024-02-04

## 2024-02-04 LAB
ANTIBODY SCREEN: NEGATIVE
BASOPHILS # BLD AUTO: 0.03 X10(3) UL (ref 0–0.2)
BASOPHILS NFR BLD AUTO: 0.3 %
EOSINOPHIL # BLD AUTO: 0.11 X10(3) UL (ref 0–0.7)
EOSINOPHIL NFR BLD AUTO: 1 %
ERYTHROCYTE [DISTWIDTH] IN BLOOD BY AUTOMATED COUNT: 13.9 %
HCT VFR BLD AUTO: 36.1 %
HGB BLD-MCNC: 11.6 G/DL
IMM GRANULOCYTES # BLD AUTO: 0.08 X10(3) UL (ref 0–1)
IMM GRANULOCYTES NFR BLD: 0.7 %
LYMPHOCYTES # BLD AUTO: 2.54 X10(3) UL (ref 1–4)
LYMPHOCYTES NFR BLD AUTO: 22.1 %
MCH RBC QN AUTO: 26.4 PG (ref 26–34)
MCHC RBC AUTO-ENTMCNC: 32.1 G/DL (ref 31–37)
MCV RBC AUTO: 82.2 FL
MONOCYTES # BLD AUTO: 0.58 X10(3) UL (ref 0.1–1)
MONOCYTES NFR BLD AUTO: 5.1 %
NEUTROPHILS # BLD AUTO: 8.13 X10 (3) UL (ref 1.5–7.7)
NEUTROPHILS # BLD AUTO: 8.13 X10(3) UL (ref 1.5–7.7)
NEUTROPHILS NFR BLD AUTO: 70.8 %
PLATELET # BLD AUTO: 286 10(3)UL (ref 150–450)
RBC # BLD AUTO: 4.39 X10(6)UL
RH BLOOD TYPE: POSITIVE
T PALLIDUM AB SER QL IA: NONREACTIVE
WBC # BLD AUTO: 11.5 X10(3) UL (ref 4–11)

## 2024-02-04 RX ORDER — TERBUTALINE SULFATE 1 MG/ML
0.25 INJECTION, SOLUTION SUBCUTANEOUS AS NEEDED
Status: DISCONTINUED | OUTPATIENT
Start: 2024-02-04 | End: 2024-02-05

## 2024-02-04 RX ORDER — HYDROMORPHONE HYDROCHLORIDE 1 MG/ML
1 INJECTION, SOLUTION INTRAMUSCULAR; INTRAVENOUS; SUBCUTANEOUS ONCE AS NEEDED
Status: ACTIVE | OUTPATIENT
Start: 2024-02-04 | End: 2024-02-04

## 2024-02-04 RX ORDER — ONDANSETRON 2 MG/ML
4 INJECTION INTRAMUSCULAR; INTRAVENOUS EVERY 6 HOURS PRN
Status: DISCONTINUED | OUTPATIENT
Start: 2024-02-04 | End: 2024-02-05 | Stop reason: HOSPADM

## 2024-02-04 RX ORDER — ACETAMINOPHEN 500 MG
1000 TABLET ORAL EVERY 6 HOURS PRN
Status: DISCONTINUED | OUTPATIENT
Start: 2024-02-04 | End: 2024-02-05

## 2024-02-04 RX ORDER — ACETAMINOPHEN 500 MG
500 TABLET ORAL EVERY 6 HOURS PRN
Status: DISCONTINUED | OUTPATIENT
Start: 2024-02-04 | End: 2024-02-05

## 2024-02-04 RX ORDER — DEXTROSE, SODIUM CHLORIDE, SODIUM LACTATE, POTASSIUM CHLORIDE, AND CALCIUM CHLORIDE 5; .6; .31; .03; .02 G/100ML; G/100ML; G/100ML; G/100ML; G/100ML
INJECTION, SOLUTION INTRAVENOUS AS NEEDED
Status: DISCONTINUED | OUTPATIENT
Start: 2024-02-04 | End: 2024-02-05

## 2024-02-04 RX ORDER — ZOLPIDEM TARTRATE 5 MG/1
5 TABLET ORAL NIGHTLY PRN
Status: DISCONTINUED | OUTPATIENT
Start: 2024-02-04 | End: 2024-02-05

## 2024-02-04 RX ORDER — SODIUM CHLORIDE, SODIUM LACTATE, POTASSIUM CHLORIDE, CALCIUM CHLORIDE 600; 310; 30; 20 MG/100ML; MG/100ML; MG/100ML; MG/100ML
INJECTION, SOLUTION INTRAVENOUS CONTINUOUS
Status: DISCONTINUED | OUTPATIENT
Start: 2024-02-04 | End: 2024-02-05

## 2024-02-04 RX ORDER — CITRIC ACID/SODIUM CITRATE 334-500MG
30 SOLUTION, ORAL ORAL AS NEEDED
Status: DISCONTINUED | OUTPATIENT
Start: 2024-02-04 | End: 2024-02-05

## 2024-02-04 RX ORDER — IBUPROFEN 600 MG/1
600 TABLET ORAL ONCE AS NEEDED
Status: DISCONTINUED | OUTPATIENT
Start: 2024-02-04 | End: 2024-02-05 | Stop reason: HOSPADM

## 2024-02-05 ENCOUNTER — ANESTHESIA EVENT (OUTPATIENT)
Dept: OBGYN UNIT | Facility: HOSPITAL | Age: 24
End: 2024-02-05
Payer: COMMERCIAL

## 2024-02-05 ENCOUNTER — ANESTHESIA (OUTPATIENT)
Dept: OBGYN UNIT | Facility: HOSPITAL | Age: 24
End: 2024-02-05
Payer: COMMERCIAL

## 2024-02-05 PROCEDURE — 3E033VJ INTRODUCTION OF OTHER HORMONE INTO PERIPHERAL VEIN, PERCUTANEOUS APPROACH: ICD-10-PCS | Performed by: OBSTETRICS & GYNECOLOGY

## 2024-02-05 PROCEDURE — 59410 OBSTETRICAL CARE: CPT | Performed by: OBSTETRICS & GYNECOLOGY

## 2024-02-05 PROCEDURE — 10907ZC DRAINAGE OF AMNIOTIC FLUID, THERAPEUTIC FROM PRODUCTS OF CONCEPTION, VIA NATURAL OR ARTIFICIAL OPENING: ICD-10-PCS | Performed by: OBSTETRICS & GYNECOLOGY

## 2024-02-05 RX ORDER — ACETAMINOPHEN 500 MG
500 TABLET ORAL EVERY 6 HOURS PRN
Status: DISCONTINUED | OUTPATIENT
Start: 2024-02-05 | End: 2024-02-06

## 2024-02-05 RX ORDER — IBUPROFEN 600 MG/1
600 TABLET ORAL EVERY 6 HOURS
Status: DISCONTINUED | OUTPATIENT
Start: 2024-02-05 | End: 2024-02-06

## 2024-02-05 RX ORDER — DOCUSATE SODIUM 100 MG/1
100 CAPSULE, LIQUID FILLED ORAL
Status: DISCONTINUED | OUTPATIENT
Start: 2024-02-05 | End: 2024-02-06

## 2024-02-05 RX ORDER — BISACODYL 10 MG
10 SUPPOSITORY, RECTAL RECTAL ONCE AS NEEDED
Status: DISCONTINUED | OUTPATIENT
Start: 2024-02-05 | End: 2024-02-06

## 2024-02-05 RX ORDER — BUPIVACAINE HCL/0.9 % NACL/PF 0.25 %
5 PLASTIC BAG, INJECTION (ML) EPIDURAL AS NEEDED
Status: DISCONTINUED | OUTPATIENT
Start: 2024-02-05 | End: 2024-02-05

## 2024-02-05 RX ORDER — SIMETHICONE 80 MG
80 TABLET,CHEWABLE ORAL 3 TIMES DAILY PRN
Status: DISCONTINUED | OUTPATIENT
Start: 2024-02-05 | End: 2024-02-06

## 2024-02-05 RX ORDER — ACETAMINOPHEN 500 MG
1000 TABLET ORAL EVERY 6 HOURS PRN
Status: DISCONTINUED | OUTPATIENT
Start: 2024-02-05 | End: 2024-02-06

## 2024-02-05 RX ORDER — LIDOCAINE HYDROCHLORIDE AND EPINEPHRINE 15; 5 MG/ML; UG/ML
INJECTION, SOLUTION EPIDURAL AS NEEDED
Status: DISCONTINUED | OUTPATIENT
Start: 2024-02-05 | End: 2024-02-05 | Stop reason: SURG

## 2024-02-05 RX ORDER — ALBUTEROL SULFATE 90 UG/1
2 AEROSOL, METERED RESPIRATORY (INHALATION) EVERY 4 HOURS PRN
Status: CANCELLED | OUTPATIENT
Start: 2024-02-05

## 2024-02-05 RX ORDER — NALBUPHINE HYDROCHLORIDE 10 MG/ML
2.5 INJECTION, SOLUTION INTRAMUSCULAR; INTRAVENOUS; SUBCUTANEOUS
Status: DISCONTINUED | OUTPATIENT
Start: 2024-02-05 | End: 2024-02-05

## 2024-02-05 RX ADMIN — LIDOCAINE HYDROCHLORIDE AND EPINEPHRINE 5 ML: 15; 5 INJECTION, SOLUTION EPIDURAL at 10:38:00

## 2024-02-05 NOTE — PLAN OF CARE
Problem: BIRTH - VAGINAL/ SECTION  Goal: Fetal and maternal status remain reassuring during the birth process  Description: INTERVENTIONS:  - Monitor vital signs  - Monitor fetal heart rate  - Monitor uterine activity  - Monitor labor progression (vaginal delivery)  - DVT prophylaxis (C/S delivery)  - Surgical antibiotic prophylaxis (C/S delivery)  Outcome: Progressing     Problem: PAIN - ADULT  Goal: Verbalizes/displays adequate comfort level or patient's stated pain goal  Description: INTERVENTIONS:  - Encourage pt to monitor pain and request assistance  - Assess pain using appropriate pain scale  - Administer analgesics based on type and severity of pain and evaluate response  - Implement non-pharmacological measures as appropriate and evaluate response  - Consider cultural and social influences on pain and pain management  - Manage/alleviate anxiety  - Utilize distraction and/or relaxation techniques  - Monitor for opioid side effects  - Notify MD/LIP if interventions unsuccessful or patient reports new pain  - Anticipate increased pain with activity and pre-medicate as appropriate  Outcome: Progressing     Problem: ANXIETY  Goal: Will report anxiety at manageable levels  Description: INTERVENTIONS:  - Administer medication as ordered  - Teach and rehearse alternative coping skills  - Provide emotional support with 1:1 interaction with staff  Outcome: Progressing     Problem: Patient/Family Goals  Goal: Patient/Family Long Term Goal  Description: Uncomplicated vaginal delivery    Interventions:  VS per protocol  I&O  Ice chips and sips as tolerated  EFM per protocol  Maintain IV as ordered  Antibiotics as needed per protocol  Informed consent  Patient's Long Term Goal:     Interventions:  - See additional Care Plan goals for specific interventions  Outcome: Progressing  Goal: Patient/Family Short Term Goal  Description: Adequate pain control with delivery of infant  Interventions:  Pain assessment  scores as ordered  Patient scores pain a \"3\" or less  Multidisciplinary care   Nonpharmacologic comfort measures    Patient's Short Term Goal:     Interventions:   - See additional Care Plan goals for specific interventions  Outcome: Progressing
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
## 2024-02-05 NOTE — ANESTHESIA PROCEDURE NOTES
Labor Analgesia    Date/Time: 2/5/2024 10:33 AM    Performed by: David Bernal MD  Authorized by: David Bernal MD      General Information and Staff    Start Time:  2/5/2024 10:33 AM  End Time:  2/5/2024 10:38 AM  Anesthesiologist:  David Bernal MD  Performed by:  Anesthesiologist  Patient Location:  OB  Site Identification: surface landmarks    Reason for Block: labor epidural    Preanesthetic Checklist: patient identified, IV checked, risks and benefits discussed, monitors and equipment checked, pre-op evaluation, timeout performed, IV bolus, anesthesia consent and sterile technique used      Procedure Details    Patient Position:  Sitting  Prep: ChloraPrep    Monitoring:  Heart rate and continuous pulse ox  Approach:  Midline    Epidural Needle    Injection Technique:   Needle Type:  Tuohy  Needle Gauge:  17 G  Needle Length:  3.375 in  Needle Insertion Depth:  5  Location:  L3-4    Spinal Needle      Catheter    Catheter Type:  End hole  Catheter Size:  19 G  Catheter at Skin Depth:  12  Test Dose:  Negative    Assessment    Sensory Level:  T8    Additional Comments

## 2024-02-05 NOTE — ANESTHESIA PREPROCEDURE EVALUATION
PRE-OP EVALUATION    Patient Name: Taylor Babcock    Admit Diagnosis: Pregnancy [Z34.90]    Pre-op Diagnosis: * No pre-op diagnosis entered *        Anesthesia Procedure: LABOR ANALGESIA    * No surgeons found in log *    Pre-op vitals reviewed.  Temp: 97.9 °F (36.6 °C)  Pulse: 71  Resp: 18  BP: 121/73  SpO2: 96 %  Body mass index is 27.98 kg/m².    Current medications reviewed.  Hospital Medications:   [COMPLETED] lactated ringers IV bolus 1,000 mL  1,000 mL Intravenous Once    fentaNYL-bupivacaine 2 mcg/mL-0.125% in sodium chloride 0.9% 100 mL EPIDURAL infusion premix  12 mL/hr Epidural Continuous    [COMPLETED] fentaNYL (Sublimaze) 50 mcg/mL injection 100 mcg  100 mcg Epidural Once    EPHEDrine (PF) 25 MG/5 ML injection 5 mg  5 mg Intravenous PRN    nalbuphine (Nubain) 10 mg/mL injection 2.5 mg  2.5 mg Intravenous Q15 Min PRN    lactated ringers infusion   Intravenous Continuous    dextrose in lactated ringers 5% infusion   Intravenous PRN    lactated ringers IV bolus 500 mL  500 mL Intravenous PRN    acetaminophen (Tylenol Extra Strength) tab 500 mg  500 mg Oral Q6H PRN    acetaminophen (Tylenol Extra Strength) tab 1,000 mg  1,000 mg Oral Q6H PRN    ibuprofen (Motrin) tab 600 mg  600 mg Oral Once PRN    ondansetron (Zofran) 4 MG/2ML injection 4 mg  4 mg Intravenous Q6H PRN    oxyTOCIN in sodium chloride 0.9% (Pitocin) 30 Units/500mL infusion premix  62.5-900 caryn-units/min Intravenous Continuous    terbutaline (Brethine) 1 MG/ML injection 0.25 mg  0.25 mg Subcutaneous PRN    sodium citrate-citric acid (Bicitra) 500-334 MG/5ML oral solution 30 mL  30 mL Oral PRN    zolpidem (Ambien) tab 5 mg  5 mg Oral Nightly PRN    [COMPLETED] ampicillin (Omnipen) 2 g in sodium chloride 0.9% 100 mL IVPB-MBP  2 g Intravenous Once    Followed by    ampicillin (Omnipen) 1 g in sodium chloride 0.9% 100 mL IVPB-MBP  1 g Intravenous Q4H    oxyTOCIN in sodium chloride 0.9% (Pitocin) 30 Units/500mL infusion premix  0.5-20  caryn-units/min Intravenous Continuous    [] HYDROmorphone (Dilaudid) 1 MG/ML injection 1 mg  1 mg Intravenous Once PRN       Outpatient Medications:     Medications Prior to Admission   Medication Sig Dispense Refill Last Dose    albuterol 108 (90 Base) MCG/ACT Inhalation Aero Soln INHALE 2 PUFFS BY MOUTH EVERY 4 HOURS FOR 10 DAYS AS NEEDED   Past Month    ergocalciferol 1.25 MG (44410 UT) Oral Cap  (Patient not taking: Reported on 2024)       Prenatal Vit-DSS-Fe Cbn-FA (PRENACARE OR) Take by mouth.   2024 at 1200       Allergies: Seasonal      Anesthesia Evaluation    Patient summary reviewed.    Anesthetic Complications           GI/Hepatic/Renal    Negative GI/hepatic/renal ROS.                             Cardiovascular    Negative cardiovascular ROS.                                                   Endo/Other    Negative endo/other ROS.                              Pulmonary      (+) asthma                     Neuro/Psych    Negative neuro/psych ROS.  (+) depression                        Patient Active Problem List:     History of  delivery     Mild postpartum depression     Pregnancy related nausea, antepartum     Supervision of other normal pregnancy, antepartum     Tetanus, diphtheria, and acellular pertussis (Tdap) vaccination declined     GBS (group B Streptococcus carrier), +RV culture, currently pregnant     Pregnancy           History reviewed. No pertinent surgical history.  Social History     Socioeconomic History    Marital status: Single   Tobacco Use    Smoking status: Never    Smokeless tobacco: Never   Vaping Use    Vaping Use: Never used   Substance and Sexual Activity    Alcohol use: Never    Drug use: Never    Sexual activity: Yes     Partners: Male   Other Topics Concern    Caffeine Concern No    Exercise Yes    Seat Belt Yes     History   Drug Use Unknown     Available pre-op labs reviewed.  Lab Results   Component Value Date    WBC 11.5 (H) 2024    RBC 4.39  02/04/2024    HGB 11.6 (L) 02/04/2024    HCT 36.1 02/04/2024    MCV 82.2 02/04/2024    MCH 26.4 02/04/2024    MCHC 32.1 02/04/2024    RDW 13.9 02/04/2024    .0 02/04/2024               Airway      Mallampati: II  Mouth opening: >3 FB  TM distance: > 6 cm  Neck ROM: full Cardiovascular    Cardiovascular exam normal.         Dental    Dentition appears grossly intact         Pulmonary    Pulmonary exam normal.                 Other findings              ASA: 2   Plan: epidural  NPO status verified and patient meets guidelines.        Comment: The risks and benefits of neuraxial anesthesia have been explained to the patient as outlined in the anesthesia consent.  Risks include but are not limited to: bleeding, infection, nerve damage, paresthesias, headaches, and incomplete block.  The consent was signed without further questions.      Plan/risks discussed with: patient                Present on Admission:  **None**

## 2024-02-05 NOTE — PROGRESS NOTES
Patient transferred to mother/baby room 1112 per cart in stable condition with baby and personal belongings.  Accompanied by family and staff. Bands matched and verified with mother/baby RN and family. Report given to mother/baby RN.

## 2024-02-05 NOTE — L&D DELIVERY NOTE
Misael Babcock [[id number]]      Labor Events     labor?: No   steroids?: None  Antibiotics received during labor?: Yes  Antibiotics (enter # doses in comment): ampicillin  Rupture date/time: 2024 0934     Rupture type: AROM  Fluid color: Clear  Labor type: Induced Onset of Labor  Induction: Oxytocin, AROM  Indications for induction: Elective       Labor Event Times    Labor onset date/time: 2024 0849  Dilation complete date/time: 2024 1448  Start pushing date/time: 2024 1459       [place] Presentation    Presentation: Vertex  Position: Left Occiput Anterior       Operative Delivery    Operative Vaginal Delivery: No                      Shoulder Dystocia    Shoulder Dystocia: No             Anesthesia    Method: Epidural              [place] Delivery      Head delivery date/time: 2024 15:01:02   Delivery date/time:  24 15:01:33   Delivery type: Normal spontaneous vaginal delivery    Details:     Delivery location: delivery room       Delivery Providers    Delivering Clinician: Lakeisha Sheth MD   Delivery personnel:  Provider Role   Yue Ritter RN Baby Nurse   Kalyn Tolbert RN Delivery Nurse             Cord    Vessels: 3 Vessels  Complications: Nuchal  # of loops: 2  Timed cord clamping: Yes  Time in sec: 30  Cord blood disposition: to lab  Gases sent?: No       Resuscitation    Method: None       [place] Measurements      Weight: 3360 g 7 lb 6.5 oz Length: 49.5 cm     Head circum.: 34.5 cm Chest circum.: 33 cm          Abdominal circum.: 30.5 cm           Placenta    Date/time: 2024 1503  Removal: Spontaneous  Appearance: Intact  Disposition: held for future pathology       Apgars    Living status: Living   Apgar Scoring Key:    0 1 2    Skin color Blue or pale Acrocyanotic Completely pink    Heart rate Absent <100 bpm >100 bpm    Reflex irritability No response Grimace Cry or active withdrawal    Muscle tone Limp Some flexion Active motion    Respiratory  effort Absent Weak cry; hypoventilation Good, crying              1 Minute:  5 Minute:  10 Minute:  15 Minute:  20 Minute:      Skin color: 0  1       Heart rate: 2  2       Reflex irritablity: 2  2       Muscle tone: 2  2       Respiratory effort: 2  2       Total: 8  9          Apgars assigned by: DESMOND RESENDIZ RN   disposition: with mother       Skin to Skin    Skin to skin initiated date/time: 2024  Skin to skin with: Mother       Vaginal Count    Initial count RN: Kalyn Tolbert RN  Initial count Tech: Murarikova, Judy   Sponges   Sharps    Initial counts 11   0    Final counts        Final count RN: Kalyn Tolbert RN       Delivery (Maternal)    Episiotomy: None  Perineal lacerations: None      Vaginal laceration?: No      Cervical laceration?: No    Clitoral laceration?: No    Quantitative blood loss (mL): 47              Vaginal Delivery Note          Taylor Babcock Patient Status:  Inpatient    2000 MRN [id number]   [facility] LABOR & DELIVERY Attending Lakeisha Sheth MD   Hosp Day # 1 PCP Jeni Coreas MD     Date of Delivery: 24    Pre Op Dx:  IUP at Term    Post Op Dx: Same - delivered    Op: Normal Spontaneous Vaginal Delivery    Surgeon: Lakeisha Sheth MD    Anesthesia: Epidural    EBL:  50 cc    Indications:  Patient is a 24 year old  at 39w2d who presented for IOL. She received cytotec per protocol. Pitocin per protocol then initiated. AROM performed. She progressed to complete cervical dilation.       Findings:    Sex: male \"TBD\"     Weight:3360g      Apgars: 8/9      Lacerations: no perineal laceration, no periurethral, no cervical   Nuchal x 2, initially tight but both reduced     Procedure:  The patients was placed in the dorsolithotomy position and prepped.  She was encouraged to push.  As the head was delivered in CHARLETTE position, the legs were lowered and the perineum was supported to decrease the risk of tearing. The shoulders rotated  easily and delivery was completed without complication.  Bulb suction was not performed.  The cord was doubly clamped then cut after 30 seconds of cord pulsation noted.  The baby was placed on the mother's abdomen at her request. The cord blood was sampled. Placenta delivered spontaneosly by uterine massage. IV Pitocin was then initiated. The uterus was explored and evacuation of blood clots noted. Uterus noted to be firm. Good hemostasis noted. The perineum, vaginal mucosa and cervix was then examined.      Bleeding was minimal.  The patient was then moved to the supine position in stable condition.  Sponge and instrument counts were correct.    Complications:  None     Mother and infant in good condition.    Lakeisha Sheth MD   [facility] - OBGYN            Note to patient and family   The 21st Century Cures Act makes medical notes available to patients in the interest of transparency.  However, please be advised that this is a medical document.  It is intended as lmwg-tw-tbud communication.  It is written and medical language may contain abbreviations or verbiage that are technical and unfamiliar.  It may appear blunt or direct.  Medical documents are intended to carry relevant information, facts as evident, and the clinical opinion of the practitioner.

## 2024-02-05 NOTE — PROGRESS NOTES
NURSING ADMISSION NOTE      Patient admitted via Wheelchair  Oriented to room.  Safety precautions initiated.  Bed in low position.  Call light in reach.  Received patient, in bed, comfortable, postpartum care and  care reviewed and will continue to assist as needed

## 2024-02-05 NOTE — PLAN OF CARE
Problem: BIRTH - VAGINAL/ SECTION  Goal: Fetal and maternal status remain reassuring during the birth process  Description: INTERVENTIONS:  - Monitor vital signs  - Monitor fetal heart rate  - Monitor uterine activity  - Monitor labor progression (vaginal delivery)  - DVT prophylaxis (C/S delivery)  - Surgical antibiotic prophylaxis (C/S delivery)  Outcome: Completed     Problem: PAIN - ADULT  Goal: Verbalizes/displays adequate comfort level or patient's stated pain goal  Description: INTERVENTIONS:  - Encourage pt to monitor pain and request assistance  - Assess pain using appropriate pain scale  - Administer analgesics based on type and severity of pain and evaluate response  - Implement non-pharmacological measures as appropriate and evaluate response  - Consider cultural and social influences on pain and pain management  - Manage/alleviate anxiety  - Utilize distraction and/or relaxation techniques  - Monitor for opioid side effects  - Notify MD/LIP if interventions unsuccessful or patient reports new pain  - Anticipate increased pain with activity and pre-medicate as appropriate  Outcome: Completed     Problem: ANXIETY  Goal: Will report anxiety at manageable levels  Description: INTERVENTIONS:  - Administer medication as ordered  - Teach and rehearse alternative coping skills  - Provide emotional support with 1:1 interaction with staff  Outcome: Completed     Problem: Patient/Family Goals  Goal: Patient/Family Long Term Goal  Description: Uncomplicated vaginal delivery    Interventions:  VS per protocol  I&O  Ice chips and sips as tolerated  EFM per protocol  Maintain IV as ordered  Antibiotics as needed per protocol  Informed consent  Patient's Long Term Goal:     Interventions:  - See additional Care Plan goals for specific interventions  Outcome: Completed  Goal: Patient/Family Short Term Goal  Description: Adequate pain control with delivery of infant  Interventions:  Pain assessment scores as  ordered  Patient scores pain a \"3\" or less  Multidisciplinary care   Nonpharmacologic comfort measures    Patient's Short Term Goal:     Interventions:   - See additional Care Plan goals for specific interventions  Outcome: Completed

## 2024-02-06 VITALS
RESPIRATION RATE: 16 BRPM | HEIGHT: 62.01 IN | DIASTOLIC BLOOD PRESSURE: 55 MMHG | HEART RATE: 68 BPM | OXYGEN SATURATION: 96 % | WEIGHT: 153 LBS | SYSTOLIC BLOOD PRESSURE: 99 MMHG | TEMPERATURE: 98 F | BODY MASS INDEX: 27.8 KG/M2

## 2024-02-06 LAB
BASOPHILS # BLD AUTO: 0.04 X10(3) UL (ref 0–0.2)
BASOPHILS NFR BLD AUTO: 0.3 %
EOSINOPHIL # BLD AUTO: 0.1 X10(3) UL (ref 0–0.7)
EOSINOPHIL NFR BLD AUTO: 0.7 %
ERYTHROCYTE [DISTWIDTH] IN BLOOD BY AUTOMATED COUNT: 14.3 %
HCT VFR BLD AUTO: 31.4 %
HGB BLD-MCNC: 10.5 G/DL
IMM GRANULOCYTES # BLD AUTO: 0.09 X10(3) UL (ref 0–1)
IMM GRANULOCYTES NFR BLD: 0.6 %
LYMPHOCYTES # BLD AUTO: 1.85 X10(3) UL (ref 1–4)
LYMPHOCYTES NFR BLD AUTO: 12.4 %
MCH RBC QN AUTO: 26.7 PG (ref 26–34)
MCHC RBC AUTO-ENTMCNC: 33.4 G/DL (ref 31–37)
MCV RBC AUTO: 79.9 FL
MONOCYTES # BLD AUTO: 0.7 X10(3) UL (ref 0.1–1)
MONOCYTES NFR BLD AUTO: 4.7 %
NEUTROPHILS # BLD AUTO: 12.13 X10 (3) UL (ref 1.5–7.7)
NEUTROPHILS # BLD AUTO: 12.13 X10(3) UL (ref 1.5–7.7)
NEUTROPHILS NFR BLD AUTO: 81.3 %
PLATELET # BLD AUTO: 247 10(3)UL (ref 150–450)
RBC # BLD AUTO: 3.93 X10(6)UL
WBC # BLD AUTO: 14.9 X10(3) UL (ref 4–11)

## 2024-02-06 RX ORDER — ONDANSETRON 2 MG/ML
4 INJECTION INTRAMUSCULAR; INTRAVENOUS EVERY 6 HOURS PRN
Status: DISCONTINUED | OUTPATIENT
Start: 2024-02-06 | End: 2024-02-06

## 2024-02-06 RX ORDER — ONDANSETRON 2 MG/ML
INJECTION INTRAMUSCULAR; INTRAVENOUS
Status: COMPLETED
Start: 2024-02-06 | End: 2024-02-06

## 2024-02-06 RX ORDER — OXYCODONE HYDROCHLORIDE 5 MG/1
5 TABLET ORAL EVERY 6 HOURS PRN
Qty: 10 TABLET | Refills: 0 | Status: SHIPPED | OUTPATIENT
Start: 2024-02-06

## 2024-02-06 RX ORDER — OXYCODONE HYDROCHLORIDE 5 MG/1
5 TABLET ORAL EVERY 6 HOURS PRN
Status: DISCONTINUED | OUTPATIENT
Start: 2024-02-06 | End: 2024-02-06

## 2024-02-06 RX ORDER — IBUPROFEN 600 MG/1
600 TABLET ORAL EVERY 6 HOURS
Qty: 30 TABLET | Refills: 0 | Status: SHIPPED | OUTPATIENT
Start: 2024-02-06

## 2024-02-06 RX ORDER — NALOXONE HYDROCHLORIDE 4 MG/.1ML
4 SPRAY NASAL AS NEEDED
Qty: 1 KIT | Refills: 0 | Status: SHIPPED | OUTPATIENT
Start: 2024-02-06

## 2024-02-06 RX ORDER — ONDANSETRON 4 MG/1
4 TABLET, FILM COATED ORAL EVERY 6 HOURS PRN
Status: DISCONTINUED | OUTPATIENT
Start: 2024-02-06 | End: 2024-02-06

## 2024-02-06 RX ORDER — ONDANSETRON 4 MG/1
4 TABLET, FILM COATED ORAL EVERY 6 HOURS PRN
Qty: 10 TABLET | Refills: 0 | Status: SHIPPED | OUTPATIENT
Start: 2024-02-06

## 2024-02-06 NOTE — PROGRESS NOTES
Patient up and about, seen by OB, ok to go home, meds in pharmacy for pick, postpartum care and  care reviewed and completed, patient will make the follow up appointment as instructed, patient verbalized understanding, signed paper, hubambi and kisses off

## 2024-02-06 NOTE — DISCHARGE INSTRUCTIONS
Nothing in the vagina for 6 weeks   No strenuous activity/exercise  May shower immediately. May take bath  Keep wound(s) clean and dry. Wash daily with warm water & soap. Do not scrub. Gently pat dry. May cover with clean gauze or pad if needed.     AVOID CONSTIPATION:   -Take Miralax one capful in water or juice each morning.  You can also take each evening iif needed.  -Take Fiber supplement along with Miralax as well.  -May also take milk of magnesia or Dulcolax over the counter if needing to have a BM more urgently than Miralax is providing    -Do NOT strain for bowel movements    Please call office if:  -fever 100.4 or higher    Please proceed to the Emergency Department at [facility] for any of the following:   -vaginal bleeding soaking greater than 1 pad per hour  -severe pelvic pain  -shortness of breath  -chest pain  -leg pain or swelling

## 2024-02-06 NOTE — CM/SW NOTE
met with patient (Taylor) to review insurance and PCP for infant. Taylor would like infant added to [facility] Medicaid. [facility] called and asked to follow up with patient to do infant add on. PCP for infant will be Dr Gloria Miller with [facility].  called [facility] to see if they are excepting any new [place]? [facility]  stated that they just started accepting limited patient as of 2/5/24. The new born must have parent on [facility] and have siblings that currently see [facility] doctor.  provide that information to Taylor and also provide list of [facility] providers in network so that Taylor has options for follow up for infant. Taylor thinks that they should be ok with Dr Miller and will call to make appointment. Taylor is breast feeding and has breast pump. WIC was reviewed. Patient has crib and car seat for infant.

## 2024-02-06 NOTE — PROGRESS NOTES
OB progress note    24 year old  female PPD#1 s/p  at 39w2d on 2024 after elective induction of labor. GBS carrier. History of  delivery. History postpartum depression     Afebrile, VSS  Pain - needing oxycodone & zofran due to severe cramping during breastfeeding   Rh+, GBS +  Ambulation encouraged  Circumcision for infant  requested by patient. Risks, benefits, alternatives discussed. All questions answered.    Disposition desires discharge home today.     Subjective: Pain when she gets cramping during breastfeeding is very intense. It was actually causing nausea & vomiting. Received Zofran & oxycodone with good relief. Lochia normal. Eating, ambulating, voiding without difficulty. Is breastfeeding. No other complaints.     Vitals:    24 1700 24 1750 24 1936 24 0737   BP: 105/70 113/67 115/58 99/55   BP Location: Right arm Left arm Right arm Left arm   Pulse: 120 101 81 68   Resp:  16 16 16   Temp:  97.8 °F (36.6 °C) 97.8 °F (36.6 °C) 97.9 °F (36.6 °C)   TempSrc:  Oral Oral Oral   SpO2:       Weight:       Height:         Temp:  [97.8 °F (36.6 °C)-98.1 °F (36.7 °C)] 97.9 °F (36.6 °C)  Pulse:  [] 68  Resp:  [16-18] 16  BP: ()/(51-86) 99/55  SpO2:  [96 %-98 %] 96 %     ibuprofen  600 mg Oral Q6H    docusate sodium  100 mg Oral BID@0600,1800       Exam:  Gen A&O, NAD  CV RRR  Lungs CTAB  Abd soft, nontender, fundus firm under umbilicus  Ext nontender, no edema    Recent Labs   Lab 24  0822   WBC 14.9*   HGB 10.5*   .0   NE 12.13*       Cat Pratt MD

## 2024-02-06 NOTE — PLAN OF CARE

## 2024-02-06 NOTE — DISCHARGE SUMMARY
[facility]    Discharge Summary    Taylor Babcock Patient Status:  Inpatient    2000 MRN [id number]   Location [facility] 1SW-J Attending Lakeisha Sheth MD   Hosp Day # 2 PCP Jeni Coreas MD     Date of Admission: 2024    Date of Discharge: 2024     Admission Diagnoses:   ***    Discharge Diagnosis:   ***    Primary OB Clinician: ***    Hospital Course:     EDC: Estimated Date of Delivery: 2/10/24    Gestational Age: 39w2d    Date of Delivery: ***    Antepartum complications: {antepartum complications:26943}    Delivered By: ***     Delivery Type:     Tubal Ligation: {obgyn tubal:66743}    Baby: Liveborn {male/female:66342},     Apgars:  1 minute:                    5 minutes:                           10 minutes:      Anesthesia: {procedures; anesthesia:2201}          Intrapartum Complications: {ob details:55792}    Laceration: {:30498}    Episiotomy: {epis incis:68647}    Placenta: {placenta delivery:02305}    Feeding Method: {Source; infant milk:}    Rh Immune Globulin Given: {YES/NO/WILD CARDS:88533}    Rubella Vaccine Given: {YES/NO/WILD CARDS:45115}        Discharge Plan:   Discharge Condition: {DISCHARGE CONDITION:}  Early Discharge:  {:68116}    Discharge medications:  Current Discharge Medication List        New Orders    Details   ibuprofen 600 MG Oral Tab Take 1 tablet (600 mg total) by mouth every 6 (six) hours.      ondansetron (ZOFRAN) 4 mg tablet Take 1 tablet (4 mg total) by mouth every 6 (six) hours as needed.      oxyCODONE 5 MG Oral Tab Take 1 tablet (5 mg total) by mouth every 6 (six) hours as needed.      Naloxone HCl 4 MG/0.1ML Nasal Liquid 4 mg by Nasal route as needed. If patient remains unresponsive, repeat dose in other nostril 2-5 minutes after first dose.           Home Meds - Unchanged    Details   albuterol 108 (90 Base) MCG/ACT Inhalation Aero Soln INHALE 2 PUFFS BY MOUTH EVERY 4 HOURS FOR 10 DAYS AS NEEDED      Prenatal Vit-DSS-Fe Cbn-FA  (PRENACARE OR) Take by mouth.                   Discharge Diet: {Discharge Diet:5710}    Discharge Activity: {Discharge Activity:5711}    Follow up:      Follow-up Information       Lakeisha Sheth MD Follow up in 6 week(s).    Specialty: OBSTETRICS & GYNECOLOGY  Why: Post Partum appointment  Contact information:  [address]  [facility] [address]  [phone]                             Follow up Labs: ***         Other Discharge Instructions:         Nothing in the vagina for 6 weeks   No strenuous activity/exercise  May shower immediately. May take bath  Keep wound(s) clean and dry. Wash daily with warm water & soap. Do not scrub. Gently pat dry. May cover with clean gauze or pad if needed.     AVOID CONSTIPATION:   -Take Miralax one capful in water or juice each morning.  You can also take each evening iif needed.  -Take Fiber supplement along with Miralax as well.  -May also take milk of magnesia or Dulcolax over the counter if needing to have a BM more urgently than Miralax is providing    -Do NOT strain for bowel movements    Please call office if:  -fever 100.4 or higher    Please proceed to the Emergency Department at [facility] for any of the following:   -vaginal bleeding soaking greater than 1 pad per hour  -severe pelvic pain  -shortness of breath  -chest pain  -leg pain or swelling          Cat Pratt MD

## 2024-02-08 ENCOUNTER — TELEPHONE (OUTPATIENT)
Dept: OBGYN UNIT | Facility: HOSPITAL | Age: 24
End: 2024-02-08

## 2024-02-12 ENCOUNTER — TELEPHONE (OUTPATIENT)
Dept: OBGYN CLINIC | Facility: CLINIC | Age: 24
End: 2024-02-12

## 2024-03-20 ENCOUNTER — POSTPARTUM (OUTPATIENT)
Dept: OBGYN CLINIC | Facility: CLINIC | Age: 24
End: 2024-03-20
Payer: MEDICAID

## 2024-03-20 VITALS
HEIGHT: 62 IN | DIASTOLIC BLOOD PRESSURE: 58 MMHG | WEIGHT: 136.25 LBS | HEART RATE: 69 BPM | SYSTOLIC BLOOD PRESSURE: 100 MMHG | BODY MASS INDEX: 25.07 KG/M2

## 2024-03-20 DIAGNOSIS — N76.0 VAGINITIS AND VULVOVAGINITIS: ICD-10-CM

## 2024-03-20 DIAGNOSIS — Z30.09 COUNSELING FOR BIRTH CONTROL REGARDING INTRAUTERINE DEVICE (IUD): ICD-10-CM

## 2024-03-20 DIAGNOSIS — Z12.4 CERVICAL CANCER SCREENING: ICD-10-CM

## 2024-03-20 PROCEDURE — 81514 NFCT DS BV&VAGINITIS DNA ALG: CPT | Performed by: NURSE PRACTITIONER

## 2024-03-20 RX ORDER — DOXYCYCLINE HYCLATE 50 MG/1
325 CAPSULE, GELATIN COATED ORAL
COMMUNITY

## 2024-03-20 NOTE — PROGRESS NOTES
[facility]  Obstetrics and Gynecology   Postpartum Progress Note    Subjective:     Taylor Babcock is a 24 year old  female who is s/p  on 2024. Her pregnancy was complicated by prior  delivery, positive group B strep, and previous post partum depression. She reports doing well. Baby is doing well and bottle feeding. The patient reports vagina bleeding has stopped. The patient denies emotional concerns. EPDS 7. She feels she has post partum depression but denies any concern for self harm or harm to others. She would like to talk with someone. She declines medication at this time.    She thinks her vaginal PH is off.    Review of Systems:  General: denies fevers, chills, fatigue and malaise.   Respiratory: denies SOB, dyspnea, cough or wheezing  Cardiovascular: denies chest pain, palpitations, exercise intolerance   GI:denies abdominal pain, diarrhea, constipation  : denies dysuria, hematuria, increased urinary frequency. denies abnormal uterine bleeding   Objective:     Vitals:    24 1222   BP: 100/58   Pulse: 69   Weight: 136 lb 4 oz (61.8 kg)   Height: 62\"         Body mass index is 24.92 kg/m².    General: AAO.NAD.   CVS exam: normal peripheral perfusion  Chest: non-labored breathing, no tachypnea   Abdominal exam: soft, nontender, nondistended  Pelvic exam:   VULVA: normal appearing vulva with no masses, tenderness or lesions  PERINEUM: intact, well healed, no signs of infection.   VAGINA: normal appearing vagina with moderate white discharge  CERVIX: normal appearing cervix without discharge or lesions  UTERUS: uterus is normal size, shape, consistency and nontender  ADNEXA: normal adnexa in size, nontender and no masses  Ext: non-tender, no edema    Labs:       Assessment:     Taylor Babcock is a 24 year old  female who presents for postpartum visit   Patient Active Problem List   Diagnosis    History of  delivery    Mild postpartum depression    Pregnancy  related nausea, antepartum ([facility])    Supervision of other normal pregnancy, antepartum ([facility])    Tetanus, diphtheria, and acellular pertussis (Tdap) vaccination declined    GBS (group B Streptococcus carrier), +RV culture, currently pregnant ([facility])    Pregnancy ([facility])     (normal spontaneous vaginal delivery) ([facility])     Plan:     Postpartum exam   - s/p    - doing well, no complaints   - no abnormal findings on physical exam   - may return to normal activity   Contraception counseling   - discussion held with patient about family planning and contraception- abstinence for 2 weeks prior to appointment and serum HCG the day prior of not placing on menses    - pt reports she would like a Kyleena IUD    Note provided for extended leave for 2 weeks pending counseling for depressive symptoms. Advised she call 911 or go to the ER with any concerns for self harm or harm to others    Thin Prep and vaginitis panel sent    All of the findings and plan were discussed with the patient.  She notes understanding and agrees with the plan of care.  All questions were answered to the best of my ability at this time.    RTC in 6 months for well woman exam or sooner if needed     BRIANA Espinoza  [facility] - OBGYN           Note to patient and family   The 21st Century Cures Act makes medical notes available to patients in the interest of transparency.  However, please be advised that this is a medical document.  It is intended as mevx-yp-fyzk communication.  It is written and medical language may contain abbreviations or verbiage that are technical and unfamiliar.  It may appear blunt or direct.  Medical documents are intended to carry relevant information, facts as evident, and the clinical opinion of the practitioner.

## 2024-03-21 LAB
BV BACTERIA DNA VAG QL NAA+PROBE: POSITIVE
C GLABRATA DNA VAG QL NAA+PROBE: NEGATIVE
C KRUSEI DNA VAG QL NAA+PROBE: NEGATIVE
CANDIDA DNA VAG QL NAA+PROBE: NEGATIVE
T VAGINALIS DNA VAG QL NAA+PROBE: NEGATIVE

## 2024-03-26 LAB
.: ABNORMAL
.: ABNORMAL

## 2024-03-27 ENCOUNTER — TELEPHONE (OUTPATIENT)
Age: 24
End: 2024-03-27

## 2024-04-05 ENCOUNTER — PATIENT MESSAGE (OUTPATIENT)
Dept: OBGYN CLINIC | Facility: CLINIC | Age: 24
End: 2024-04-05

## 2024-04-05 NOTE — TELEPHONE ENCOUNTER
From: Taylor Babcock  To: Dillon Pritchett  Sent: 4/5/2024 7:57 AM CDT  Subject: Bleeding heavy after period     Hi Dr. pritchett, I am a little worried because I am bleeding heavy after I just had my period. I’m not sure why. Last time this happened to me i had an STD but I have only been with the same person over 2 years. Please get back to me when you are able to thank you.

## 2024-05-14 ENCOUNTER — TELEPHONE (OUTPATIENT)
Dept: OBGYN CLINIC | Facility: CLINIC | Age: 24
End: 2024-05-14

## 2024-05-14 NOTE — TELEPHONE ENCOUNTER
Pt has been having breakthrough bleeding. She is asking if there is any test that she can be tested for to see why she is bleeding?    Pt requested an appt for next Thursday as that is her only day off of work (started a new job) but that day is booked.     She has colpo on     Future Appointments   Date Time Provider Department Center   6/3/2024 12:00 PM Lakeisha Sheth MD [facility] [address]

## 2024-05-14 NOTE — TELEPHONE ENCOUNTER
Called to follow up with patient.     Patient describes irregularities with menstrual cycle and breakthrough spotting. No symptoms, patient concerned as this was how she manifested with prior STD.     Patient was encouraged to follow up with PCP or urgent care to complete STD testing, BV/yeast swab, and advised to take a pregnancy test. Patient agreeable.     Patient states recently weaned off breastfeeding in 03/24.     Scheduled office visit to discuss irregular periods and menstrual cycle management. Has scheduled colposcopy, instructed patient to call to reschedule if on period at that time.     No further questions.

## 2024-05-16 LAB
CHLAMYDIA TRACHOMATIS$RNA, TMA: NOT DETECTED
NEISSERIA GONORRHOEAE$RNA, TMA: NOT DETECTED

## 2024-06-03 ENCOUNTER — OFFICE VISIT (OUTPATIENT)
Dept: OBGYN CLINIC | Facility: CLINIC | Age: 24
End: 2024-06-03
Payer: COMMERCIAL

## 2024-06-03 VITALS
HEIGHT: 62 IN | DIASTOLIC BLOOD PRESSURE: 64 MMHG | SYSTOLIC BLOOD PRESSURE: 108 MMHG | BODY MASS INDEX: 24.7 KG/M2 | HEART RATE: 79 BPM | WEIGHT: 134.25 LBS

## 2024-06-03 DIAGNOSIS — L65.9 HAIR LOSS: ICD-10-CM

## 2024-06-03 DIAGNOSIS — R87.612 LGSIL ON PAP SMEAR OF CERVIX: Primary | ICD-10-CM

## 2024-06-03 DIAGNOSIS — Z01.812 PRE-PROCEDURAL LABORATORY EXAMINATION: ICD-10-CM

## 2024-06-03 DIAGNOSIS — Z30.09 ENCOUNTER FOR OTHER GENERAL COUNSELING OR ADVICE ON CONTRACEPTION: ICD-10-CM

## 2024-06-03 LAB
CONTROL LINE PRESENT WITH A CLEAR BACKGROUND (YES/NO): YES YES/NO
KIT LOT #: NORMAL NUMERIC
PREGNANCY TEST, URINE: NEGATIVE

## 2024-06-03 PROCEDURE — 88305 TISSUE EXAM BY PATHOLOGIST: CPT | Performed by: OBSTETRICS & GYNECOLOGY

## 2024-06-03 PROCEDURE — 88342 IMHCHEM/IMCYTCHM 1ST ANTB: CPT | Performed by: OBSTETRICS & GYNECOLOGY

## 2024-06-03 RX ORDER — DROSPIRENONE 4 MG/1
1 TABLET, FILM COATED ORAL DAILY
Qty: 28 TABLET | Refills: 0 | COMMUNITY
Start: 2024-06-03 | End: 2025-06-03

## 2024-06-03 NOTE — PROCEDURES
Colposcopy Procedure Note    Date of Procedure: 06/03/24    Indications: 24 year old y/o female with LSIL noted on pap smear on 03/20/24. The patient with history of normal pap smears.     Pre-procedure diagnosis:   LSIL    Post-procedure diagnosis:  LSIL    Procedure:  Colposcopy   Cervical Biopsy   ECC     Procedure Details:   A discussion was held with patient including diagnosis, prognosis and management. Recommendation made for colposcopy with possible biopsies. Risks, benefits and alteratives were discussed with the patient. The patient was agreed to proceed with procedure. She provided written and verbal consent. The patient was positioned supine and in stir-ups.    The sterile speculum was placed in the vagina and the cervix was visualized. The vagina appeared normal with no lesions or discolorations noted. The cervix was then swabbed with sterile saline and no lesions seen under gross examination. Green light filter was negative.      After application of acetic acid, no white feathery acetowhite changes were noted. Please refer to image provided below. No mocaism, no punctations seen on gross examination. This findings were consistent after the application of Lugol's solution.     A biopsy was then collected at each indicated lesion site.         The transformation zone was fully visualized. No lesions noted. satisfactory Colposcopy. ECC was collected.     Biopsy sites were examined. Silver Nitrate was applied to the cervix. Good hemostasis noted. All instruments were removed from the vagina.     All tissue were placed into the designated specimen containers and collected to be sent to pathology.     Impression: SHAZIA 1 or normal pending final pathology     Disposition: Stable. RTC in 1 year for well woman exam or sooner if needed.        Lakeisha Sheth MD   [facility] - OBGYN    Discussed with patient that there will not be further notification of normal or benign results other than receiving results on [facility].  A [employer] message or telephone call will be placed by the physician and/or office staff if results are abnormal.       Note to patient and family   The 21st Century Cures Act makes medical notes available to patients in the interest of transparency.  However, please be advised that this is a medical document.  It is intended as vwgx-ru-xrdv communication.  It is written and medical language may contain abbreviations or verbiage that are technical and unfamiliar.  It may appear blunt or direct.  Medical documents are intended to carry relevant information, facts as evident, and the clinical opinion of the practitioner.      This note could include assistance by Dragon voice recognition. Errors in content may be related to improper recognition by the system; efforts to review and correct have been done but errors may still exist.

## 2024-06-03 NOTE — PROGRESS NOTES
Patient presents for scheduled colposcopy 2/2 LSIL noted on pap smear on 03/20/24. The patient with history of normal pap smears. Discussion held with the patient regarding pap smear findings and recommendations. Patient recommended colposcopy with cervical biopsies and ECC for further evaluation. The risks, benefits and alternatives were discussed with the patient. All questions and concerns were answered. The patient was agreeable with the recommendations. She provided written and verbal consent. Please refer to procedure note for further details.     She has not had Gardisil vaccine. D/w patient including risks, benefits and alternatives. Patient provided with pamphlet. D/w patient HPV including prognosis, management and sexual transmission. Also d/w patient continue with tobacco use abstinence.       There is no immunization history on file for this patient.        Lakeisha Sheth MD   [facility] - OBGYN    Note to patient and family   The 21st Century Cures Act makes medical notes available to patients in the interest of transparency.  However, please be advised that this is a medical document.  It is intended as rzmr-lq-hfhl communication.  It is written and medical language may contain abbreviations or verbiage that are technical and unfamiliar.  It may appear blunt or direct.  Medical documents are intended to carry relevant information, facts as evident, and the clinical opinion of the practitioner.    This note could include assistance by Dragon voice recognition. Errors in content may be related to improper recognition by the system; efforts to review and correct have been done but errors may still exist.
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
## 2024-06-03 NOTE — PATIENT INSTRUCTIONS
[facility] Department of OB/GYN  After Care Instructions for Colposcopy/Biopsy      Biopsy Results   You will receive a phone call with your biopsy results in 7 business days.  If you have not received your biopsy results in 7 days, please contact our office.  Your biopsy results will help the physician decide if and what further treatment is  necessary.    Bleeding   After your biopsy, the area is swabbed with a brown solution to help stop any bleeding.  For this reason, you may notice a brown or black clotty discharge lasting several days.  If you experience bright red bleeding that is heavy, you should call the office.     Restrictions    You should avoid douching, intercourse and tampon use for 3 days following your procedure.    Pain    If you are uncomfortable after the procedure, you may use Aleve, Tylenol or Ibuprofen for the discomfort.        If you have additional questions or concerns, please call us at [phone].

## 2024-06-26 ENCOUNTER — PATIENT MESSAGE (OUTPATIENT)
Dept: OBGYN CLINIC | Facility: CLINIC | Age: 24
End: 2024-06-26

## 2024-06-26 DIAGNOSIS — Z30.016 ENCOUNTER FOR INITIAL PRESCRIPTION OF TRANSDERMAL PATCH HORMONAL CONTRACEPTIVE DEVICE: Primary | ICD-10-CM

## 2024-06-27 RX ORDER — NORELGESTROMIN AND ETHINYL ESTRADIOL 35; 150 UG/MG; UG/MG
1 PATCH TRANSDERMAL WEEKLY
Qty: 12 PATCH | Refills: 3 | Status: SHIPPED | OUTPATIENT
Start: 2024-06-27

## 2024-06-27 NOTE — TELEPHONE ENCOUNTER
Patient also seen on 6/3/2024 for contraception counseling.  Patient was provided Slynd.  However, she may change her contraception to contraceptive patch.  New prescription for Xulane provided.  Please notify the patient there is a slight, overall low risk of possible decrease breast milk supply if the patient is currently breast feeding or pumping for breastmilk.  Thank you.

## 2024-06-27 NOTE — TELEPHONE ENCOUNTER
Patient last seen on 06/03/2024 for colposcopy with Dr. Sheth.   Postpartum appt on 03/20/2024.     Requesting birth control patch at this time.   Routed to provider for further review.

## 2024-06-27 NOTE — TELEPHONE ENCOUNTER
From: Taylor Babcock  To: Lakeisha Sheth  Sent: 6/26/2024 5:31 PM CDT  Subject: Birth control    Hi Dr. Sheth,    I was wondering if I have to schedule an appointment to get a prescription of the birth control patch? It’s hard for me to get seen in person so even If i can do a telephone visit It would be more convenient if it’s needed. Please get back to me when you are able. Thank you.

## 2024-09-18 DIAGNOSIS — Z30.016 ENCOUNTER FOR INITIAL PRESCRIPTION OF TRANSDERMAL PATCH HORMONAL CONTRACEPTIVE DEVICE: ICD-10-CM

## 2024-09-19 RX ORDER — NORELGESTROMIN AND ETHINYL ESTRADIOL 35; 150 UG/MG; UG/MG
1 PATCH TRANSDERMAL WEEKLY
Qty: 12 PATCH | Refills: 3 | OUTPATIENT
Start: 2024-09-19

## 2024-09-19 NOTE — TELEPHONE ENCOUNTER
Last OV: 6/3/2024 Postpartum  Last refill date:   Medication Quantity Refills Start End   Norelgestromin-Eth Estradiol (XULANE) 150-35 MCG/24HR Transdermal Patch Weekly 12 patch 3 6/27/2024 --   Sig:   Place 1 patch onto the skin once a week. Place 1 patch on skin once a week for 3 weeks followed by 1 week break.       Follow-up: RTC for annual  Next appt.: none scheduled  Patient notified that refills are available at the pharmacy.

## 2024-11-26 NOTE — PROGRESS NOTES
SIERRA  [id number]  GA: 30w5d  Vitals:    23 1431   BP: 102/60   Pulse: 89   Weight: 144 lb 2 oz (65.4 kg)   Height: 62\"       Doing well, +FM  Denies LOF/VB/uctx  Rh +, TDAP declined, EPDS 0  PTL and Fetal movement instructions given  Reminded patient to complete repeat HIV      Problem List Items Addressed This Visit          Gravid and     History of  delivery    Supervision of other normal pregnancy, antepartum - Primary       RTC in 2 wks      Note to patient and family   The [employer] [address] makes medical notes available to patients in the interest of transparency. However, please be advised that this is a medical document. It is intended as ncrv-vy-ymot communication. It is written and medical language may contain abbreviations or verbiage that are technical and unfamiliar. It may appear blunt or direct. Medical documents are intended to carry relevant information, facts as evident, and the clinical opinion of the practitioner. show

## 2025-05-30 ENCOUNTER — TELEPHONE (OUTPATIENT)
Dept: OBGYN CLINIC | Facility: CLINIC | Age: 25
End: 2025-05-30

## 2025-05-30 NOTE — TELEPHONE ENCOUNTER
Pt currently on BC patches and having symptoms of bad rash with burning. Would like to speak with nurse about possible medication prescriptions of oral BC. Pls call via cell phone #

## 2025-05-30 NOTE — TELEPHONE ENCOUNTER
I spoke with patient, she has been experiencing rash, itching, & burning at birth control patch site. She does rotate sights. She was seen in IC for symptoms & was given cream but it is not helping. She has tried allergy pills such as Zyrtec & Benadryl & that does not help. She would like to stop patch & change birth control, she would  like an IUD. Appointment scheduled for patient to discuss birth control side effects & alternate options. Patient expresses understanding.

## 2025-06-05 ENCOUNTER — OFFICE VISIT (OUTPATIENT)
Dept: OBGYN CLINIC | Facility: CLINIC | Age: 25
End: 2025-06-05
Payer: MEDICAID

## 2025-06-05 VITALS
SYSTOLIC BLOOD PRESSURE: 98 MMHG | HEIGHT: 62 IN | DIASTOLIC BLOOD PRESSURE: 62 MMHG | BODY MASS INDEX: 23.55 KG/M2 | WEIGHT: 128 LBS | HEART RATE: 70 BPM

## 2025-06-05 DIAGNOSIS — Z30.09 BIRTH CONTROL COUNSELING: Primary | ICD-10-CM

## 2025-06-05 DIAGNOSIS — Z30.011 BCP (BIRTH CONTROL PILLS) INITIATION: ICD-10-CM

## 2025-06-05 PROCEDURE — 99213 OFFICE O/P EST LOW 20 MIN: CPT | Performed by: NURSE PRACTITIONER

## 2025-06-05 RX ORDER — NORETHINDRONE ACETATE AND ETHINYL ESTRADIOL 1MG-20(21)
1 KIT ORAL DAILY
Qty: 84 TABLET | Refills: 4 | Status: SHIPPED | OUTPATIENT
Start: 2025-06-05 | End: 2026-06-05

## 2025-06-05 RX ORDER — CLINDAMYCIN HYDROCHLORIDE 300 MG/1
CAPSULE ORAL
COMMUNITY
Start: 2024-08-07

## 2025-06-05 RX ORDER — ACETAMINOPHEN AND CODEINE PHOSPHATE 300; 30 MG/1; MG/1
1 TABLET ORAL EVERY 6 HOURS PRN
COMMUNITY
Start: 2025-06-03

## 2025-06-05 NOTE — PROGRESS NOTES
Gyne note     S: patient is a 25 year old yo  here to discuss changing her birth control. She has been using the birth control patch. They changed the generic from Zefemy to Xulane then returned to Zafemy. When she went to Xulane she started to have a rash and skin irritation. Once she returned to Zafemy the skin irritation never went away.    She is interested in another Kyleena IUD or the pill.    Review of Systems:  General: denies fevers, chills, fatigue and malaise.     O:BP 98/62   Pulse 70   Ht 62\"   Wt 128 lb (58.1 kg)   LMP 2025 (Exact Date)   BMI 23.41 kg/m²     Gen NAD         A/P:  1. Birth control counseling  Discussed the Kyleena IUD including risk for infection, uterine perforation, PID, ectopic and intrauterine pregnancy. Also reviewed oral contraceptive and Nuvaring use as well as risk for VTE.    2. BCP (birth control pills) initiation  - Norethin Ace-Eth Estrad-FE (JUNEL FE 1/20) 1-20 MG-MCG Oral Tab; Take 1 tablet by mouth daily.  Dispense: 84 tablet; Refill: 4

## 2025-06-30 ENCOUNTER — OFFICE VISIT (OUTPATIENT)
Dept: OBGYN CLINIC | Facility: CLINIC | Age: 25
End: 2025-06-30
Payer: MEDICAID

## 2025-06-30 VITALS
HEIGHT: 63 IN | HEART RATE: 73 BPM | SYSTOLIC BLOOD PRESSURE: 98 MMHG | WEIGHT: 128.63 LBS | BODY MASS INDEX: 22.79 KG/M2 | DIASTOLIC BLOOD PRESSURE: 58 MMHG

## 2025-06-30 DIAGNOSIS — Z01.419 WELL WOMAN EXAM WITH ROUTINE GYNECOLOGICAL EXAM: ICD-10-CM

## 2025-06-30 DIAGNOSIS — L65.9 HAIR LOSS: ICD-10-CM

## 2025-06-30 DIAGNOSIS — Z12.4 CERVICAL CANCER SCREENING: Primary | ICD-10-CM

## 2025-06-30 DIAGNOSIS — N90.89 VULVAR IRRITATION: ICD-10-CM

## 2025-06-30 PROCEDURE — 88175 CYTOPATH C/V AUTO FLUID REDO: CPT | Performed by: STUDENT IN AN ORGANIZED HEALTH CARE EDUCATION/TRAINING PROGRAM

## 2025-06-30 PROCEDURE — 99395 PREV VISIT EST AGE 18-39: CPT | Performed by: STUDENT IN AN ORGANIZED HEALTH CARE EDUCATION/TRAINING PROGRAM
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
## 2025-07-01 LAB
BV BACTERIA DNA VAG QL NAA+PROBE: NEGATIVE
C GLABRATA DNA VAG QL NAA+PROBE: NEGATIVE
C KRUSEI DNA VAG QL NAA+PROBE: NEGATIVE
CANDIDA DNA VAG QL NAA+PROBE: POSITIVE
HPV E6+E7 MRNA CVX QL NAA+PROBE: NEGATIVE
T VAGINALIS DNA VAG QL NAA+PROBE: NEGATIVE

## 2025-07-02 LAB
ABSOLUTE BASOPHILS: 18 CELLS/UL (ref 0–200)
ABSOLUTE EOSINOPHILS: 221 CELLS/UL (ref 15–500)
ABSOLUTE LYMPHOCYTES: 1845 CELLS/UL (ref 850–3900)
ABSOLUTE MONOCYTES: 248 CELLS/UL (ref 200–950)
ABSOLUTE NEUTROPHILS: 2268 CELLS/UL (ref 1500–7800)
BASOPHILS: 0.4 %
EOSINOPHILS: 4.8 %
FERRITIN: 91 NG/ML (ref 16–154)
HEMATOCRIT: 42.5 % (ref 35–45)
HEMOGLOBIN: 13.2 G/DL (ref 11.7–15.5)
LYMPHOCYTES: 40.1 %
MCH: 28.6 PG (ref 27–33)
MCHC: 31.1 G/DL (ref 32–36)
MCV: 92.2 FL (ref 80–100)
MONOCYTES: 5.4 %
MPV: 11.2 FL (ref 7.5–12.5)
NEUTROPHILS: 49.3 %
PLATELET COUNT: 267 THOUSAND/UL (ref 140–400)
RDW: 13 % (ref 11–15)
RED BLOOD CELL COUNT: 4.61 MILLION/UL (ref 3.8–5.1)
TSH W/REFLEX TO FT4: 1.4 MIU/L
WHITE BLOOD CELL COUNT: 4.6 THOUSAND/UL (ref 3.8–10.8)

## (undated) NOTE — LETTER
Taylor Babcock, :2000    CONSENT FOR PROCEDURE/SEDATION    1. I authorize the performance upon Talyor Babcock  the following: Colposcopy with biopsy and Endocervical curettage    2. I authorize Dr. Lakeisha Sheth MD (and whomever is designated as the doctor’s assistant), to perform the above-mentioned procedures.    3. If any unforeseen conditions arise during this procedure calling for additional  procedures, operations, or medications (including anesthesia and blood transfusion), I further request and authorize the doctor to do whatever he/she deems advisable in my interest.    4. I consent to the taking and reproduction of any photographs in the course of this procedure for professional purposes.    5. I consent to the administration of such sedation as may be considered necessary or advisable by the physician responsible for this service, with the exception of ______________________________________________________    6. I have been informed by my doctor of the nature and purpose of this procedure sedation, possible alternative methods of treatment, risk involved and possible complications.    7. If I have a Do Not Resuscitate (DNR) order in place, the physician and I (or the individual authorized to consent on my behalf) will discuss and agree as to whether the Do Not Resuscitate (DNR) order will remain in effect or will be discontinued during the performance of the procedure and the applicable recovery period. If the Do Not Resuscitate (DNR) order is discontinued and is to be reinstated following the procedure/recovery period, the physician will determine when the applicable recovery period ends for purposes of reinstating the Do Not Resuscitate (DNR) order.    Signature of Patient:_______________________________________________    Signature of person authorized to consent for patient:  _______________________________________________________________    Relationship to patient:  ____________________________________________    Witness: _________________________________________ Date:___________     Physician Signature: _______________________________ Date:___________

## (undated) NOTE — LETTER
03/20/24    To Whom It May Concern,    Taylor Babcock 1/26/2000 was recommended to have her maternity leave extended an additional 2 weeks from delivery which would be 4/1/2024 pending any additional changes. Thank you,    BRIANA Espinoza

## (undated) NOTE — LETTER
23    Re: Giles Sauceda  : 2000    To Whom It May Concern:  Giles Sauceda is under my care. Was seen today for an appointment. If you have any questions concerning this letter, please feel free to contact my office.       Sincerely yours,      Raegan Rao MD

## (undated) NOTE — Clinical Note
Would be interested in moving IOL to the weekend - 2/3 is Dr. Worrell if we could consider asking her or could move to the next weekend.

## (undated) NOTE — LETTER
Dear New MomYrn, we missed you! The nurses of [facility] have tried to reach you by phone to ask if you have any questions regarding your health or the health and care of your new little one.    We hope you are doing well. If, for any reason, you have questions or concerns about your health or your baby’s health, please contact your provider or your pediatrician or family medicine physician regarding your baby.     At [facility], we feel that postpartum support is very important for new families. Please see the enclosed new parent support flyer that lists support programs and resources with both in-person and online options.     Additionally, our Breastfeeding Centers at [facility] and [facility] in [place], offer outpatient visits with our International Board-Certified Lactation   Consultants (IBCLCs) for any breastfeeding concerns or questions you may have.    For issues related to stress, anxiety or depression, we have a Nurturing Mom support group that meets both in-person or online.  There’s also a 24-hour Mom’s Line where you can request a phone call from a clinical therapist for assistance for postpartum depression.    We encourage you to take advantage of these programs and resources as you recover from childbirth and learn to care for your new infant.    Best wishes,    [facility]            [id number]